# Patient Record
Sex: FEMALE | Race: WHITE | NOT HISPANIC OR LATINO | URBAN - METROPOLITAN AREA
[De-identification: names, ages, dates, MRNs, and addresses within clinical notes are randomized per-mention and may not be internally consistent; named-entity substitution may affect disease eponyms.]

---

## 2017-01-31 ENCOUNTER — EMERGENCY (EMERGENCY)
Facility: HOSPITAL | Age: 67
LOS: 1 days | Discharge: HOME | End: 2017-01-31
Attending: EMERGENCY MEDICINE | Admitting: EMERGENCY MEDICINE
Payer: COMMERCIAL

## 2017-01-31 VITALS
DIASTOLIC BLOOD PRESSURE: 79 MMHG | TEMPERATURE: 98 F | WEIGHT: 128.09 LBS | HEIGHT: 69.5 IN | SYSTOLIC BLOOD PRESSURE: 126 MMHG | OXYGEN SATURATION: 97 % | RESPIRATION RATE: 18 BRPM | HEART RATE: 61 BPM

## 2017-01-31 VITALS
TEMPERATURE: 98 F | HEART RATE: 59 BPM | RESPIRATION RATE: 18 BRPM | SYSTOLIC BLOOD PRESSURE: 122 MMHG | OXYGEN SATURATION: 97 % | DIASTOLIC BLOOD PRESSURE: 68 MMHG

## 2017-01-31 DIAGNOSIS — R10.32 LEFT LOWER QUADRANT PAIN: ICD-10-CM

## 2017-01-31 DIAGNOSIS — R42 DIZZINESS AND GIDDINESS: ICD-10-CM

## 2017-01-31 DIAGNOSIS — G43.909 MIGRAINE, UNSPECIFIED, NOT INTRACTABLE, WITHOUT STATUS MIGRAINOSUS: ICD-10-CM

## 2017-01-31 DIAGNOSIS — K02.7 DENTAL ROOT CARIES: Chronic | ICD-10-CM

## 2017-01-31 DIAGNOSIS — Z98.890 OTHER SPECIFIED POSTPROCEDURAL STATES: Chronic | ICD-10-CM

## 2017-01-31 LAB
ALBUMIN SERPL ELPH-MCNC: 3.5 G/DL — SIGNIFICANT CHANGE UP (ref 3.4–5)
ALP SERPL-CCNC: 58 U/L — SIGNIFICANT CHANGE UP (ref 40–120)
ALT FLD-CCNC: 27 U/L — SIGNIFICANT CHANGE UP (ref 12–42)
ANION GAP SERPL CALC-SCNC: 6 MMOL/L — LOW (ref 9–16)
APPEARANCE UR: CLEAR — SIGNIFICANT CHANGE UP
APTT BLD: 30.7 SEC — SIGNIFICANT CHANGE UP (ref 27.5–37.4)
AST SERPL-CCNC: 18 U/L — SIGNIFICANT CHANGE UP (ref 15–37)
BASOPHILS NFR BLD AUTO: 0.5 % — SIGNIFICANT CHANGE UP (ref 0–2)
BILIRUB SERPL-MCNC: 0.4 MG/DL — SIGNIFICANT CHANGE UP (ref 0.2–1.2)
BILIRUB UR-MCNC: NEGATIVE — SIGNIFICANT CHANGE UP
BUN SERPL-MCNC: 10 MG/DL — SIGNIFICANT CHANGE UP (ref 7–23)
CALCIUM SERPL-MCNC: 9.1 MG/DL — SIGNIFICANT CHANGE UP (ref 8.5–10.5)
CHLORIDE SERPL-SCNC: 104 MMOL/L — SIGNIFICANT CHANGE UP (ref 96–108)
CK MB CFR SERPL CALC: <1 NG/ML — SIGNIFICANT CHANGE UP (ref 0.5–3.6)
CK SERPL-CCNC: 95 U/L — SIGNIFICANT CHANGE UP (ref 26–192)
CO2 SERPL-SCNC: 30 MMOL/L — SIGNIFICANT CHANGE UP (ref 22–31)
COLOR SPEC: YELLOW — SIGNIFICANT CHANGE UP
CREAT SERPL-MCNC: 0.57 MG/DL — SIGNIFICANT CHANGE UP (ref 0.5–1.3)
DIFF PNL FLD: (no result)
EOSINOPHIL NFR BLD AUTO: 0.5 % — SIGNIFICANT CHANGE UP (ref 0–6)
GLUCOSE SERPL-MCNC: 126 MG/DL — HIGH (ref 70–99)
GLUCOSE UR QL: NEGATIVE — SIGNIFICANT CHANGE UP
HCT VFR BLD CALC: 38.7 % — SIGNIFICANT CHANGE UP (ref 34.5–45)
HGB BLD-MCNC: 13.2 G/DL — SIGNIFICANT CHANGE UP (ref 11.5–15.5)
INR BLD: 1.1 — SIGNIFICANT CHANGE UP (ref 0.88–1.16)
KETONES UR-MCNC: NEGATIVE — SIGNIFICANT CHANGE UP
LEUKOCYTE ESTERASE UR-ACNC: NEGATIVE — SIGNIFICANT CHANGE UP
LYMPHOCYTES # BLD AUTO: 29.9 % — SIGNIFICANT CHANGE UP (ref 13–44)
MCHC RBC-ENTMCNC: 31.1 PG — SIGNIFICANT CHANGE UP (ref 27–34)
MCHC RBC-ENTMCNC: 34.1 G/DL — SIGNIFICANT CHANGE UP (ref 32–36)
MCV RBC AUTO: 91.1 FL — SIGNIFICANT CHANGE UP (ref 80–100)
MONOCYTES NFR BLD AUTO: 5.6 % — SIGNIFICANT CHANGE UP (ref 2–14)
NEUTROPHILS NFR BLD AUTO: 63.5 % — SIGNIFICANT CHANGE UP (ref 43–77)
NITRITE UR-MCNC: NEGATIVE — SIGNIFICANT CHANGE UP
PH UR: 6 — SIGNIFICANT CHANGE UP (ref 4–8)
PLATELET # BLD AUTO: 279 K/UL — SIGNIFICANT CHANGE UP (ref 150–400)
POTASSIUM SERPL-MCNC: 4.6 MMOL/L — SIGNIFICANT CHANGE UP (ref 3.5–5.3)
POTASSIUM SERPL-SCNC: 4.6 MMOL/L — SIGNIFICANT CHANGE UP (ref 3.5–5.3)
PROT SERPL-MCNC: 7.2 G/DL — SIGNIFICANT CHANGE UP (ref 6.4–8.2)
PROT UR-MCNC: NEGATIVE MG/DL — SIGNIFICANT CHANGE UP
PROTHROM AB SERPL-ACNC: 12.2 SEC — SIGNIFICANT CHANGE UP (ref 10–13.1)
RBC # BLD: 4.25 M/UL — SIGNIFICANT CHANGE UP (ref 3.8–5.2)
RBC # FLD: 13.2 % — SIGNIFICANT CHANGE UP (ref 10.3–16.9)
SODIUM SERPL-SCNC: 140 MMOL/L — SIGNIFICANT CHANGE UP (ref 135–145)
SP GR SPEC: 1.02 — SIGNIFICANT CHANGE UP (ref 1–1.03)
TROPONIN I SERPL-MCNC: <0.015 NG/ML — SIGNIFICANT CHANGE UP (ref 0.01–0.04)
UROBILINOGEN FLD QL: 0.2 E.U./DL — SIGNIFICANT CHANGE UP
WBC # BLD: 6.3 K/UL — SIGNIFICANT CHANGE UP (ref 3.8–10.5)
WBC # FLD AUTO: 6.3 K/UL — SIGNIFICANT CHANGE UP (ref 3.8–10.5)

## 2017-01-31 PROCEDURE — 70450 CT HEAD/BRAIN W/O DYE: CPT

## 2017-01-31 PROCEDURE — 70450 CT HEAD/BRAIN W/O DYE: CPT | Mod: 26

## 2017-01-31 PROCEDURE — 80053 COMPREHEN METABOLIC PANEL: CPT

## 2017-01-31 PROCEDURE — 82553 CREATINE MB FRACTION: CPT

## 2017-01-31 PROCEDURE — 74177 CT ABD & PELVIS W/CONTRAST: CPT

## 2017-01-31 PROCEDURE — 81003 URINALYSIS AUTO W/O SCOPE: CPT

## 2017-01-31 PROCEDURE — 96374 THER/PROPH/DIAG INJ IV PUSH: CPT | Mod: XU

## 2017-01-31 PROCEDURE — 74177 CT ABD & PELVIS W/CONTRAST: CPT | Mod: 26

## 2017-01-31 PROCEDURE — 85730 THROMBOPLASTIN TIME PARTIAL: CPT

## 2017-01-31 PROCEDURE — 84484 ASSAY OF TROPONIN QUANT: CPT

## 2017-01-31 PROCEDURE — 99285 EMERGENCY DEPT VISIT HI MDM: CPT | Mod: 25

## 2017-01-31 PROCEDURE — 85610 PROTHROMBIN TIME: CPT

## 2017-01-31 PROCEDURE — 99284 EMERGENCY DEPT VISIT MOD MDM: CPT | Mod: 25

## 2017-01-31 PROCEDURE — 82550 ASSAY OF CK (CPK): CPT

## 2017-01-31 PROCEDURE — 81001 URINALYSIS AUTO W/SCOPE: CPT

## 2017-01-31 PROCEDURE — 96375 TX/PRO/DX INJ NEW DRUG ADDON: CPT | Mod: XU

## 2017-01-31 PROCEDURE — 93005 ELECTROCARDIOGRAM TRACING: CPT

## 2017-01-31 PROCEDURE — 85025 COMPLETE CBC W/AUTO DIFF WBC: CPT

## 2017-01-31 PROCEDURE — 93010 ELECTROCARDIOGRAM REPORT: CPT

## 2017-01-31 RX ORDER — ONDANSETRON 8 MG/1
4 TABLET, FILM COATED ORAL ONCE
Qty: 0 | Refills: 0 | Status: DISCONTINUED | OUTPATIENT
Start: 2017-01-31 | End: 2017-01-31

## 2017-01-31 RX ORDER — METOCLOPRAMIDE HCL 10 MG
10 TABLET ORAL ONCE
Qty: 0 | Refills: 0 | Status: COMPLETED | OUTPATIENT
Start: 2017-01-31 | End: 2017-01-31

## 2017-01-31 RX ORDER — SODIUM CHLORIDE 9 MG/ML
1000 INJECTION INTRAMUSCULAR; INTRAVENOUS; SUBCUTANEOUS ONCE
Qty: 0 | Refills: 0 | Status: COMPLETED | OUTPATIENT
Start: 2017-01-31 | End: 2017-01-31

## 2017-01-31 RX ORDER — IOHEXOL 300 MG/ML
50 INJECTION, SOLUTION INTRAVENOUS ONCE
Qty: 0 | Refills: 0 | Status: COMPLETED | OUTPATIENT
Start: 2017-01-31 | End: 2017-01-31

## 2017-01-31 RX ORDER — ACETAMINOPHEN 500 MG
1000 TABLET ORAL ONCE
Qty: 0 | Refills: 0 | Status: COMPLETED | OUTPATIENT
Start: 2017-01-31 | End: 2017-01-31

## 2017-01-31 RX ADMIN — Medication 400 MILLIGRAM(S): at 13:56

## 2017-01-31 RX ADMIN — Medication 104 MILLIGRAM(S): at 13:56

## 2017-01-31 RX ADMIN — SODIUM CHLORIDE 1333.33 MILLILITER(S): 9 INJECTION INTRAMUSCULAR; INTRAVENOUS; SUBCUTANEOUS at 13:56

## 2017-01-31 RX ADMIN — IOHEXOL 50 MILLILITER(S): 300 INJECTION, SOLUTION INTRAVENOUS at 13:57

## 2017-01-31 NOTE — ED PROVIDER NOTE - MEDICAL DECISION MAKING DETAILS
67 yo female with h/o migraine HA, recent root canal surgery not currently on meds presenting with dizziness that is not vertigo, HA, vomiting since Sunday. Pt is well appearing on exam with nl VS, nl cardiac, pulmonary, abdominal and neurological exam. Labs, EKG, CT scans unremarkable for acute pathology. Pt declined CXR. Feeling much better after IVFs, tylenol and reglan. Pt seen by PMD in ER who agrees with plan to discharge and follow up as outpatient. 65 yo female with h/o migraine HA, recent root canal surgery not currently on meds presenting with dizziness that is not vertigo, HA, vomiting since Sunday. Pt is well appearing on exam with nl VS, nl cardiac, pulmonary, abdominal and neurological exam. Labs, EKG, CT scans unremarkable for acute pathology. Pt declined CXR. Feeling much better after IVFs, tylenol and reglan. Very low concern for SAH given gradual onset and 4/10, intraabdominal pathology or ACS. Pt seen by PMD in ER who agrees with plan to discharge and follow up as outpatient.

## 2017-01-31 NOTE — ED PROVIDER NOTE - OBJECTIVE STATEMENT
67 yo female with h/o migraine, recent root canal a few weeks ago, pelvic sono secondary to persistent LLQ pain for 2 weeks presenting with dizziness, HA, mild photophobia and vomiting since Sunday. Pt denies abdominal pain currently. Denies fever, chills, changes in vision. States that dizziness is not vertigo. Denies chest pain, shortness of breath, focal weakness, slurred speech, changes in vision. 65 yo female with h/o migraine, recent root canal a few weeks ago, pelvic sono secondary to persistent LLQ pain for 2 weeks presenting with dizziness, gradual onset HA, mild photophobia and vomiting since Sunday. Pt denies abdominal pain currently. Denies fever, chills, changes in vision. States that dizziness is not vertigo. Denies chest pain, shortness of breath, focal weakness, slurred speech, changes in vision. 65 yo female with h/o migraine, recent root canal a few weeks ago, pelvic sono secondary to persistent LLQ pain for 2 weeks presenting with dizziness, gradual onset 4/10 HA, mild photophobia and vomiting since Sunday. Pt denies abdominal pain currently. Denies fever, chills, changes in vision. States that dizziness is not vertigo. Denies chest pain, shortness of breath, focal weakness, slurred speech, changes in vision.

## 2017-01-31 NOTE — ED CLERICAL - NS ED CLERK NOTE PRE-ARRIVAL INFORMATION; ADDITIONAL PRE-ARRIVAL INFORMATION
68 Y/O F FRED GUADARRAMA BEING SENT IN BY CLARIBEL STRONG FOR ABPAIN FOR 1 WEEK VOMITING FOR 3 DAYS (PT IS THE  WIFE VIP)

## 2017-01-31 NOTE — ED PROVIDER NOTE - CARE PLAN
Principal Discharge DX:	Migraine without aura and without status migrainosus, not intractable  Secondary Diagnosis:	Non-intractable vomiting with nausea, unspecified vomiting type

## 2017-01-31 NOTE — ED ADULT NURSE NOTE - OBJECTIVE STATEMENT
Patient reports feeling dizziness 2 days ago accompanied by bouts of nausea and 2 episodes of vomitus yesterday food particles with decreased PO intake, food and fluids.  Patient denies diarrhea/constipation.  Alcohol use occasional, denies tobacco use.  Patient denies any recent trauma or surgeries.  Denies chest pain, shortness of breath.  Denies dysuria, hematuria.  Spouse at bedside.  Patient arrived as a referral from Dr. Collins's office, [facility] provider.  Provider to evaluate and determine appropriate treatment.  EKG performed in triage and shows Sinus bradycardia with arrhythmia.  Patient does reports higher stress level in recent weeks along with 3 visits to the dentist for root canal work.

## 2017-01-31 NOTE — ED ADULT NURSE REASSESSMENT NOTE - COMFORT CARE
repositioned/plan of care explained/po fluids offered/darkened lights/warm blanket provided/wait time explained

## 2017-01-31 NOTE — ED ADULT NURSE REASSESSMENT NOTE - NS ED NURSE REASSESS COMMENT FT1
Patient given sandwich and juice and tolerating.  Patient preparing for discharge.
Patient ready for CT scan.  To be taken to scanner in 5 minutes.

## 2017-11-27 ENCOUNTER — INPATIENT (INPATIENT)
Facility: HOSPITAL | Age: 67
LOS: 0 days | Discharge: HOME | DRG: 313 | End: 2017-11-28
Attending: INTERNAL MEDICINE | Admitting: INTERNAL MEDICINE
Payer: COMMERCIAL

## 2017-11-27 VITALS
HEART RATE: 86 BPM | OXYGEN SATURATION: 100 % | SYSTOLIC BLOOD PRESSURE: 105 MMHG | TEMPERATURE: 98 F | RESPIRATION RATE: 18 BRPM | WEIGHT: 130.07 LBS | HEIGHT: 69 IN | DIASTOLIC BLOOD PRESSURE: 74 MMHG

## 2017-11-27 DIAGNOSIS — K02.7 DENTAL ROOT CARIES: Chronic | ICD-10-CM

## 2017-11-27 DIAGNOSIS — R07.89 OTHER CHEST PAIN: ICD-10-CM

## 2017-11-27 DIAGNOSIS — Z29.9 ENCOUNTER FOR PROPHYLACTIC MEASURES, UNSPECIFIED: ICD-10-CM

## 2017-11-27 DIAGNOSIS — Z98.890 OTHER SPECIFIED POSTPROCEDURAL STATES: Chronic | ICD-10-CM

## 2017-11-27 DIAGNOSIS — R63.8 OTHER SYMPTOMS AND SIGNS CONCERNING FOOD AND FLUID INTAKE: ICD-10-CM

## 2017-11-27 LAB
ALBUMIN SERPL ELPH-MCNC: 4.1 G/DL — SIGNIFICANT CHANGE UP (ref 3.3–5)
ALP SERPL-CCNC: 51 U/L — SIGNIFICANT CHANGE UP (ref 40–120)
ALT FLD-CCNC: 29 U/L — SIGNIFICANT CHANGE UP (ref 10–45)
ANION GAP SERPL CALC-SCNC: 13 MMOL/L — SIGNIFICANT CHANGE UP (ref 5–17)
APTT BLD: 29.6 SEC — SIGNIFICANT CHANGE UP (ref 27.5–37.4)
AST SERPL-CCNC: 30 U/L — SIGNIFICANT CHANGE UP (ref 10–40)
BASOPHILS NFR BLD AUTO: 0.3 % — SIGNIFICANT CHANGE UP (ref 0–2)
BILIRUB SERPL-MCNC: 0.3 MG/DL — SIGNIFICANT CHANGE UP (ref 0.2–1.2)
BUN SERPL-MCNC: 17 MG/DL — SIGNIFICANT CHANGE UP (ref 7–23)
CALCIUM SERPL-MCNC: 9.6 MG/DL — SIGNIFICANT CHANGE UP (ref 8.4–10.5)
CHLORIDE SERPL-SCNC: 103 MMOL/L — SIGNIFICANT CHANGE UP (ref 96–108)
CK MB CFR SERPL CALC: 1.6 NG/ML — SIGNIFICANT CHANGE UP (ref 0–6.7)
CK MB CFR SERPL CALC: 2.5 NG/ML — SIGNIFICANT CHANGE UP (ref 0–6.7)
CK SERPL-CCNC: 126 U/L — SIGNIFICANT CHANGE UP (ref 25–170)
CK SERPL-CCNC: 85 U/L — SIGNIFICANT CHANGE UP (ref 25–170)
CO2 SERPL-SCNC: 25 MMOL/L — SIGNIFICANT CHANGE UP (ref 22–31)
CREAT SERPL-MCNC: 0.64 MG/DL — SIGNIFICANT CHANGE UP (ref 0.5–1.3)
EOSINOPHIL NFR BLD AUTO: 0.4 % — SIGNIFICANT CHANGE UP (ref 0–6)
GLUCOSE SERPL-MCNC: 103 MG/DL — HIGH (ref 70–99)
HCT VFR BLD CALC: 37.3 % — SIGNIFICANT CHANGE UP (ref 34.5–45)
HGB BLD-MCNC: 12.9 G/DL — SIGNIFICANT CHANGE UP (ref 11.5–15.5)
INR BLD: 1.06 — SIGNIFICANT CHANGE UP (ref 0.88–1.16)
LYMPHOCYTES # BLD AUTO: 34.6 % — SIGNIFICANT CHANGE UP (ref 13–44)
MAGNESIUM SERPL-MCNC: 1.8 MG/DL — SIGNIFICANT CHANGE UP (ref 1.6–2.6)
MCHC RBC-ENTMCNC: 31.5 PG — SIGNIFICANT CHANGE UP (ref 27–34)
MCHC RBC-ENTMCNC: 34.6 G/DL — SIGNIFICANT CHANGE UP (ref 32–36)
MCV RBC AUTO: 91 FL — SIGNIFICANT CHANGE UP (ref 80–100)
MONOCYTES NFR BLD AUTO: 8 % — SIGNIFICANT CHANGE UP (ref 2–14)
NEUTROPHILS NFR BLD AUTO: 56.7 % — SIGNIFICANT CHANGE UP (ref 43–77)
PHOSPHATE SERPL-MCNC: 2.5 MG/DL — SIGNIFICANT CHANGE UP (ref 2.5–4.5)
PLATELET # BLD AUTO: 260 K/UL — SIGNIFICANT CHANGE UP (ref 150–400)
POTASSIUM SERPL-MCNC: 4 MMOL/L — SIGNIFICANT CHANGE UP (ref 3.5–5.3)
POTASSIUM SERPL-SCNC: 4 MMOL/L — SIGNIFICANT CHANGE UP (ref 3.5–5.3)
PROT SERPL-MCNC: 7.3 G/DL — SIGNIFICANT CHANGE UP (ref 6–8.3)
PROTHROM AB SERPL-ACNC: 11.8 SEC — SIGNIFICANT CHANGE UP (ref 9.8–12.7)
RBC # BLD: 4.1 M/UL — SIGNIFICANT CHANGE UP (ref 3.8–5.2)
RBC # FLD: 13.2 % — SIGNIFICANT CHANGE UP (ref 10.3–16.9)
SODIUM SERPL-SCNC: 141 MMOL/L — SIGNIFICANT CHANGE UP (ref 135–145)
TROPONIN T SERPL-MCNC: <0.01 NG/ML — SIGNIFICANT CHANGE UP (ref 0–0.01)
TROPONIN T SERPL-MCNC: <0.01 NG/ML — SIGNIFICANT CHANGE UP (ref 0–0.01)
WBC # BLD: 6.8 K/UL — SIGNIFICANT CHANGE UP (ref 3.8–10.5)
WBC # FLD AUTO: 6.8 K/UL — SIGNIFICANT CHANGE UP (ref 3.8–10.5)

## 2017-11-27 PROCEDURE — 71010: CPT | Mod: 26

## 2017-11-27 PROCEDURE — 93010 ELECTROCARDIOGRAM REPORT: CPT

## 2017-11-27 PROCEDURE — 75574 CT ANGIO HRT W/3D IMAGE: CPT | Mod: 26

## 2017-11-27 PROCEDURE — 86077 PHYS BLOOD BANK SERV XMATCH: CPT

## 2017-11-27 PROCEDURE — 99285 EMERGENCY DEPT VISIT HI MDM: CPT | Mod: 25

## 2017-11-27 RX ORDER — METOPROLOL TARTRATE 50 MG
25 TABLET ORAL ONCE
Qty: 0 | Refills: 0 | Status: COMPLETED | OUTPATIENT
Start: 2017-11-27 | End: 2017-11-27

## 2017-11-27 RX ORDER — LIDOCAINE 4 G/100G
10 CREAM TOPICAL ONCE
Qty: 0 | Refills: 0 | Status: COMPLETED | OUTPATIENT
Start: 2017-11-27 | End: 2017-11-27

## 2017-11-27 RX ORDER — SODIUM CHLORIDE 9 MG/ML
1000 INJECTION INTRAMUSCULAR; INTRAVENOUS; SUBCUTANEOUS
Qty: 0 | Refills: 0 | Status: DISCONTINUED | OUTPATIENT
Start: 2017-11-27 | End: 2017-11-28

## 2017-11-27 RX ORDER — FAMOTIDINE 10 MG/ML
20 INJECTION INTRAVENOUS ONCE
Qty: 0 | Refills: 0 | Status: COMPLETED | OUTPATIENT
Start: 2017-11-27 | End: 2017-11-27

## 2017-11-27 RX ORDER — SODIUM CHLORIDE 9 MG/ML
1000 INJECTION INTRAMUSCULAR; INTRAVENOUS; SUBCUTANEOUS ONCE
Qty: 0 | Refills: 0 | Status: COMPLETED | OUTPATIENT
Start: 2017-11-27 | End: 2017-11-27

## 2017-11-27 RX ORDER — ASPIRIN/CALCIUM CARB/MAGNESIUM 324 MG
325 TABLET ORAL ONCE
Qty: 0 | Refills: 0 | Status: COMPLETED | OUTPATIENT
Start: 2017-11-27 | End: 2017-11-27

## 2017-11-27 RX ORDER — PANTOPRAZOLE SODIUM 20 MG/1
40 TABLET, DELAYED RELEASE ORAL
Qty: 0 | Refills: 0 | Status: DISCONTINUED | OUTPATIENT
Start: 2017-11-27 | End: 2017-11-28

## 2017-11-27 RX ADMIN — FAMOTIDINE 20 MILLIGRAM(S): 10 INJECTION INTRAVENOUS at 12:38

## 2017-11-27 RX ADMIN — Medication 30 MILLILITER(S): at 12:38

## 2017-11-27 RX ADMIN — Medication 325 MILLIGRAM(S): at 12:38

## 2017-11-27 RX ADMIN — LIDOCAINE 10 MILLILITER(S): 4 CREAM TOPICAL at 12:38

## 2017-11-27 RX ADMIN — SODIUM CHLORIDE 75 MILLILITER(S): 9 INJECTION INTRAMUSCULAR; INTRAVENOUS; SUBCUTANEOUS at 19:55

## 2017-11-27 RX ADMIN — SODIUM CHLORIDE 2000 MILLILITER(S): 9 INJECTION INTRAMUSCULAR; INTRAVENOUS; SUBCUTANEOUS at 17:06

## 2017-11-27 RX ADMIN — Medication 25 MILLIGRAM(S): at 13:07

## 2017-11-27 NOTE — ED ADULT NURSE NOTE - NS ED NURSE DISCH DISPOSITION
Patient arrives with complaint of left arm pain that started today. Patient denies unifocal weakness, dizziness, generalized weakness, headache, shortness of breath, or fall.  
Admitted

## 2017-11-27 NOTE — H&P ADULT - PROBLEM SELECTOR PLAN 1
- Pain description initially concerning for cardiac etiology although less likely given negative workup. Now associated with food intake with feeling of food getting stuck in throat and pain when it passes into stomach. Etiologies include esophagitis, diverticulum, stricture, achalasia or other motility d/o.   - Dr. Collins spoke to Dr. Vega and plan is for endoscopy tomorrow provided no acute changes.  - Puree diet, NPO at midnight.  - T&S in AM with morning labs.

## 2017-11-27 NOTE — ED ADULT NURSE NOTE - CHPI ED SYMPTOMS NEG
no back pain/no diaphoresis/no shortness of breath/no cough/no syncope/no nausea/no chills/no vomiting/no fever/no dizziness

## 2017-11-27 NOTE — ED ADULT TRIAGE NOTE - CHIEF COMPLAINT QUOTE
PT CO CP and Dysphagia since 0300 today.  Pt states "It hurts to swallow but the pain is in my chest."  Pt denies N/V/D, SOB, Fevers, Dizziness at this time.  EKG in progress.

## 2017-11-27 NOTE — ED PROVIDER NOTE - MEDICAL DECISION MAKING DETAILS
68 yo female, otherwise healthy, s/p back surgery 1994, takes baby aspirin, Vit B and fish oil daily, no prescription meds, presents with chest pain that woke her up from sleep approx 10 hours ago (3 am) which radiated to her left arm described as "burning sensation and heaviness". Pt took chantel seltzer with some improvement and went back to bed. Woke up with some dull pain and this am approx 8 am (5 hours ago) she was eating a muffin and experienced sharp pains in mid chest, "felt like it was stuck in there...something is in there". labs WNL. CTA chest with no acute findings. Pt with sharp pain mid sternum when attempting to eat a cheese sandwich in ED. No pooling of secretions. Case discussed with Dr. Collins and pt admitted for upper endoscopy by Dr. Vega tomorrow. Dr. Vega aware.

## 2017-11-27 NOTE — H&P ADULT - HISTORY OF PRESENT ILLNESS
67F no pmhx who presents with chest pain that woke her up from sleep at 3am and at that time radiated to her left arm. She initially described the pain as sharp and then burning followed by a feeling of heaviness. She took chantel-seltzer and walked around her apartment and the pain subsided. She woke up and had some residual dull pain but went to work. When she was eating a 67F no pmhx who presents with chest pain that woke her up from sleep at 3am and at that time radiated to her left arm. She initially described the pain as sharp and then burning followed by a feeling of heaviness. She took chantel-seltzer and walked around her apartment and the pain subsided. She woke up and had some residual dull pain but went to work. When she was eating a muffin at work she felt the food go down and get stuck mid chest. She swallowed a few more times and felt the food pass with a sharp pain mid-chest which then became a dull pain. She spoke to her PMD and came to the ED. In the ED VS: HR 51-86, BP /57-74, RR 16-18, SpO2 100% RA. She was given , 1L NS bolus, Maalox/Famotidine/Lidocaine PO, Metoprolol 25mg once and endorsed to the medicine service.    She denies any current CP/SOB/F/C/N/V/Diarrh./Constip.RIOS/PND. No history of GERD. Father had heart disease, but unclear what type. No changes in diet. Exercise stress test 5ya which was reportedly normal. ROS otherwise negative.

## 2017-11-27 NOTE — H&P ADULT - NSHPLABSRESULTS_GEN_ALL_CORE
.  LABS:                         12.9   6.8   )-----------( 260      ( 27 Nov 2017 12:16 )             37.3     11-27    141  |  103  |  17  ----------------------------<  103<H>  4.0   |  25  |  0.64    Ca    9.6      27 Nov 2017 12:16    TPro  7.3  /  Alb  4.1  /  TBili  0.3  /  DBili  x   /  AST  30  /  ALT  29  /  AlkPhos  51  11-27    PT/INR - ( 27 Nov 2017 12:16 )   PT: 11.8 sec;   INR: 1.06          PTT - ( 27 Nov 2017 12:16 )  PTT:29.6 sec    CARDIAC MARKERS ( 27 Nov 2017 12:16 )  x     / <0.01 ng/mL / 126 U/L / x     / 2.5 ng/mL      Serum Pro-Brain Natriuretic Peptide: 52 pg/mL (11-27 @ 12:16)        RADIOLOGY, EKG & ADDITIONAL TESTS: Reviewed.     < from: CT Angio Cardiac w/ IV Cont (11.27.17 @ 18:02) >    IMPRESSION:   1.  The calcium score is0   2.  Normal coronary arteries  3.  Normal ejection fraction without segmental wall motion abnormalities    < end of copied text >    < from: Xray Chest 1 View AP-PORTABLE IMMEDIATE (11.27.17 @ 12:44) >    Impression:  Clear lungs.    < end of copied text >

## 2017-11-27 NOTE — H&P ADULT - PROBLEM SELECTOR PLAN 2
- Patient follows with Dr. Lopez - Cardiologist  - EKG NSR, CTA negative for dissection, calcium score 0, No wall motion abnormalities. Cardiac enzymes negative x1.   - f/u 10pm cardiac enzymes.  - Reach out to Dr. Lopez in AM for collateral.

## 2017-11-27 NOTE — ED PROVIDER NOTE - PROGRESS NOTE DETAILS
labs/ studies noted. CTA discussed with radiology and with no acute findings, no PE/ aortic dissection/ acute lung findings. CTA coronaries with no acute findings per cardiology fellow/ Dr. Zepeda. po trial attempted in ED- pt tolerating po liquids but with sharp substernal CP when she tried to swallow a bite of a cheese sandwich. States she has had an upper endoscopy "years ago" for "stomach issues" with no acute findings. case discussed with Dr. Collins and pt to be admitted for upper endoscopy by Dr. Vega. Dr. Vega aware. labs/ studies noted. CTA discussed with radiology and with no acute findings, no PE/ aortic dissection/ acute lung findings. CTA coronaries with no acute findings per cardiology fellow/ Dr. Zepeda (normal coronaries with zero calcium score). Po trial attempted in ED- pt tolerating po liquids but with sharp substernal CP when she tried to swallow a bite of a cheese sandwich. States she has had an upper endoscopy "years ago" for "stomach issues" with no acute findings. case discussed with Dr. Collins and pt to be admitted for upper endoscopy by Dr. Vega. Dr. Vega aware.

## 2017-11-27 NOTE — H&P ADULT - ASSESSMENT
67F w/ non-specific chest pain seemingly related to food although woke patient up in the middle of the night. EKG NSR, CTA w/ normal coronaries and negative for dissection. Cardiac enzymes negative x 1. Likely GI related.

## 2017-11-27 NOTE — H&P ADULT - NSHPPHYSICALEXAM_GEN_ALL_CORE
.  VITAL SIGNS:  T(C): 36.8 (11-27-17 @ 18:12), Max: 36.8 (11-27-17 @ 14:31)  T(F): 98.2 (11-27-17 @ 18:12), Max: 98.2 (11-27-17 @ 14:31)  HR: 68 (11-27-17 @ 18:12) (51 - 86)  BP: 118/68 (11-27-17 @ 18:12) (92/57 - 118/68)  BP(mean): --  RR: 17 (11-27-17 @ 18:12) (15 - 18)  SpO2: 99% (11-27-17 @ 18:12) (98% - 100%)  Wt(kg): --    PHYSICAL EXAM:    Constitutional: WDWN resting comfortably in bed; NAD  Head: NC/AT  Eyes: PERRL, EOMI, anicteric sclera  ENT: no nasal discharge; uvula midline, no oropharyngeal erythema or exudates; MMM  Neck: supple; no JVD or thyromegaly. small thyroid nodule, non-tender.  Respiratory: CTA B/L; no W/R/R, no retractions  Cardiac: +S1/S2; RRR; no M/R/G; PMI non-displaced  Gastrointestinal: soft, NT/ND; no rebound or guarding; +BSx4  Back: spine midline, no bony tenderness or step-offs; no CVAT B/L  Extremities: WWP, no clubbing or cyanosis; no peripheral edema  Musculoskeletal: NROM x4; no joint swelling, tenderness or erythema  Vascular: 2+ radial, femoral, DP/PT pulses B/L  Dermatologic: skin warm, dry and intact; no rashes, wounds, or scars  Lymphatic: no submandibular or cervical LAD  Neurologic: AAOx3; CNII-XII grossly intact; no focal deficits  Psychiatric: affect and characteristics of appearance, verbalizations, behaviors are appropriate

## 2017-11-27 NOTE — ED PROVIDER NOTE - CONDUCTED A DETAILED DISCUSSION WITH PATIENT AND/OR GUARDIAN REGARDING, MDM
return to ED if symptoms worsen, persist or questions arise/radiology results/need to admit/lab results

## 2017-11-27 NOTE — ED PROVIDER NOTE - OBJECTIVE STATEMENT
68 yo female, otherwise healthy, s/p back surgery 1994, takes baby aspirin, Vit B and fish oil daily, no prescription meds, presents with chest pain that woke her up from sleep approx 10 hours ago (3 am) which radiated to her left arm described as "burning sensation and heaviness". Pt took chantel seltzer with some improvement and went back to bed. Woke up with some dull pain and this am approx 8 am (5 hours ago) she was eating a muffin and experienced sharp pains in mid chest, "felt like it was stuck in there...something is in there" which then became a dull pain. Tolerating secretions. No stridor/ N/V/ abd pain/ fevers/ chills/ paresthesias/ weakness/ focal neuro sxs. No hx of GERD. Did not eat anything unusual last night. Had an exercise stress test approx 5 years ago which she reports was normal. 66 yo female, otherwise healthy, s/p back surgery 1994, takes baby aspirin, Vit B and fish oil daily, no prescription meds, presents with chest pain that woke her up from sleep approx 10 hours ago (3 am) which radiated to her left arm described as "burning sensation and heaviness". Pt took chantel seltzer with some improvement and went back to bed. Woke up with some dull pain and this am approx 8 am (5 hours ago) she was eating a muffin and experienced sharp pains in mid chest, "felt like it was stuck in there...something is in there" which then became a dull pain. Tolerating secretions. No stridor/ N/V/ abd pain/ fevers/ chills/ paresthesias/ weakness/ focal neuro sxs/ blurry vision/ speech changes. No hx of GERD. Did not eat anything unusual last night. Had an exercise stress test approx 5 years ago which she reports was normal.

## 2017-11-27 NOTE — ED PROVIDER NOTE - ENMT, MLM
Airway patent, Nasal mucosa clear. Mouth with normal mucosa. Throat has no vesicles, no oropharyngeal exudates and uvula is midline. Airway patent, Nasal mucosa clear. Mouth with normal mucosa. Throat has no vesicles, no oropharyngeal exudates and uvula is midline. No pooling of secretions.

## 2017-11-28 ENCOUNTER — RESULT REVIEW (OUTPATIENT)
Age: 67
End: 2017-11-28

## 2017-11-28 VITALS
RESPIRATION RATE: 17 BRPM | SYSTOLIC BLOOD PRESSURE: 112 MMHG | DIASTOLIC BLOOD PRESSURE: 72 MMHG | TEMPERATURE: 98 F | HEART RATE: 73 BPM | OXYGEN SATURATION: 95 %

## 2017-11-28 DIAGNOSIS — R07.9 CHEST PAIN, UNSPECIFIED: ICD-10-CM

## 2017-11-28 LAB
ANION GAP SERPL CALC-SCNC: 11 MMOL/L — SIGNIFICANT CHANGE UP (ref 5–17)
BLD GP AB SCN SERPL QL: POSITIVE — SIGNIFICANT CHANGE UP
BLD GP AB SCN SERPL QL: POSITIVE — SIGNIFICANT CHANGE UP
BUN SERPL-MCNC: 10 MG/DL — SIGNIFICANT CHANGE UP (ref 7–23)
CALCIUM SERPL-MCNC: 9 MG/DL — SIGNIFICANT CHANGE UP (ref 8.4–10.5)
CHLORIDE SERPL-SCNC: 107 MMOL/L — SIGNIFICANT CHANGE UP (ref 96–108)
CO2 SERPL-SCNC: 25 MMOL/L — SIGNIFICANT CHANGE UP (ref 22–31)
CREAT SERPL-MCNC: 0.63 MG/DL — SIGNIFICANT CHANGE UP (ref 0.5–1.3)
GLUCOSE SERPL-MCNC: 91 MG/DL — SIGNIFICANT CHANGE UP (ref 70–99)
HCT VFR BLD CALC: 34.8 % — SIGNIFICANT CHANGE UP (ref 34.5–45)
HGB BLD-MCNC: 11.5 G/DL — SIGNIFICANT CHANGE UP (ref 11.5–15.5)
INR BLD: 1.1 — SIGNIFICANT CHANGE UP (ref 0.88–1.16)
MAGNESIUM SERPL-MCNC: 1.8 MG/DL — SIGNIFICANT CHANGE UP (ref 1.6–2.6)
MCHC RBC-ENTMCNC: 30.8 PG — SIGNIFICANT CHANGE UP (ref 27–34)
MCHC RBC-ENTMCNC: 33 G/DL — SIGNIFICANT CHANGE UP (ref 32–36)
MCV RBC AUTO: 93.3 FL — SIGNIFICANT CHANGE UP (ref 80–100)
PLATELET # BLD AUTO: 233 K/UL — SIGNIFICANT CHANGE UP (ref 150–400)
POTASSIUM SERPL-MCNC: 4.4 MMOL/L — SIGNIFICANT CHANGE UP (ref 3.5–5.3)
POTASSIUM SERPL-SCNC: 4.4 MMOL/L — SIGNIFICANT CHANGE UP (ref 3.5–5.3)
PROTHROM AB SERPL-ACNC: 12.2 SEC — SIGNIFICANT CHANGE UP (ref 9.8–12.7)
RBC # BLD: 3.73 M/UL — LOW (ref 3.8–5.2)
RBC # FLD: 13.2 % — SIGNIFICANT CHANGE UP (ref 10.3–16.9)
RH IG SCN BLD-IMP: NEGATIVE — SIGNIFICANT CHANGE UP
RH IG SCN BLD-IMP: NEGATIVE — SIGNIFICANT CHANGE UP
SODIUM SERPL-SCNC: 143 MMOL/L — SIGNIFICANT CHANGE UP (ref 135–145)
TSH SERPL-MCNC: 0.87 UIU/ML — SIGNIFICANT CHANGE UP (ref 0.35–4.94)
WBC # BLD: 6.5 K/UL — SIGNIFICANT CHANGE UP (ref 3.8–10.5)
WBC # FLD AUTO: 6.5 K/UL — SIGNIFICANT CHANGE UP (ref 3.8–10.5)

## 2017-11-28 PROCEDURE — 36415 COLL VENOUS BLD VENIPUNCTURE: CPT

## 2017-11-28 PROCEDURE — 86900 BLOOD TYPING SEROLOGIC ABO: CPT

## 2017-11-28 PROCEDURE — 83690 ASSAY OF LIPASE: CPT

## 2017-11-28 PROCEDURE — 93005 ELECTROCARDIOGRAM TRACING: CPT

## 2017-11-28 PROCEDURE — 85730 THROMBOPLASTIN TIME PARTIAL: CPT

## 2017-11-28 PROCEDURE — 85610 PROTHROMBIN TIME: CPT

## 2017-11-28 PROCEDURE — 82553 CREATINE MB FRACTION: CPT

## 2017-11-28 PROCEDURE — 99285 EMERGENCY DEPT VISIT HI MDM: CPT | Mod: 25

## 2017-11-28 PROCEDURE — 75574 CT ANGIO HRT W/3D IMAGE: CPT

## 2017-11-28 PROCEDURE — 88305 TISSUE EXAM BY PATHOLOGIST: CPT

## 2017-11-28 PROCEDURE — 86901 BLOOD TYPING SEROLOGIC RH(D): CPT

## 2017-11-28 PROCEDURE — 84100 ASSAY OF PHOSPHORUS: CPT

## 2017-11-28 PROCEDURE — 84484 ASSAY OF TROPONIN QUANT: CPT

## 2017-11-28 PROCEDURE — 82550 ASSAY OF CK (CPK): CPT

## 2017-11-28 PROCEDURE — 85025 COMPLETE CBC W/AUTO DIFF WBC: CPT

## 2017-11-28 PROCEDURE — 86870 RBC ANTIBODY IDENTIFICATION: CPT

## 2017-11-28 PROCEDURE — 88341 IMHCHEM/IMCYTCHM EA ADD ANTB: CPT

## 2017-11-28 PROCEDURE — 85379 FIBRIN DEGRADATION QUANT: CPT

## 2017-11-28 PROCEDURE — 71045 X-RAY EXAM CHEST 1 VIEW: CPT

## 2017-11-28 PROCEDURE — 85027 COMPLETE CBC AUTOMATED: CPT

## 2017-11-28 PROCEDURE — 80048 BASIC METABOLIC PNL TOTAL CA: CPT

## 2017-11-28 PROCEDURE — 86850 RBC ANTIBODY SCREEN: CPT

## 2017-11-28 PROCEDURE — 86904 BLOOD TYPING PATIENT SERUM: CPT

## 2017-11-28 PROCEDURE — 83735 ASSAY OF MAGNESIUM: CPT

## 2017-11-28 PROCEDURE — 84443 ASSAY THYROID STIM HORMONE: CPT

## 2017-11-28 PROCEDURE — 80053 COMPREHEN METABOLIC PANEL: CPT

## 2017-11-28 PROCEDURE — 96374 THER/PROPH/DIAG INJ IV PUSH: CPT | Mod: XU

## 2017-11-28 PROCEDURE — 83880 ASSAY OF NATRIURETIC PEPTIDE: CPT

## 2017-11-28 PROCEDURE — 86880 COOMBS TEST DIRECT: CPT

## 2017-11-28 RX ORDER — ASPIRIN/CALCIUM CARB/MAGNESIUM 324 MG
1 TABLET ORAL
Qty: 0 | Refills: 0 | COMMUNITY

## 2017-11-28 RX ORDER — PANTOPRAZOLE SODIUM 20 MG/1
1 TABLET, DELAYED RELEASE ORAL
Qty: 30 | Refills: 0
Start: 2017-11-28 | End: 2017-12-28

## 2017-11-28 RX ORDER — ASPIRIN/CALCIUM CARB/MAGNESIUM 324 MG
81 TABLET ORAL DAILY
Qty: 0 | Refills: 0 | Status: DISCONTINUED | OUTPATIENT
Start: 2017-11-28 | End: 2017-11-28

## 2017-11-28 RX ORDER — MAGNESIUM SULFATE 500 MG/ML
1 VIAL (ML) INJECTION ONCE
Qty: 0 | Refills: 0 | Status: COMPLETED | OUTPATIENT
Start: 2017-11-28 | End: 2017-11-28

## 2017-11-28 RX ORDER — SUCRALFATE 1 G
1 TABLET ORAL
Qty: 120 | Refills: 0
Start: 2017-11-28 | End: 2017-12-28

## 2017-11-28 RX ADMIN — Medication 100 GRAM(S): at 09:51

## 2017-11-28 RX ADMIN — SODIUM CHLORIDE 75 MILLILITER(S): 9 INJECTION INTRAMUSCULAR; INTRAVENOUS; SUBCUTANEOUS at 06:54

## 2017-11-28 NOTE — PROGRESS NOTE ADULT - PROBLEM SELECTOR PLAN 2
- Patient follows with Dr. Lopez - Cardiologist  - EKG NSR, CTA negative for dissection, calcium score 0, No wall motion abnormalities. Cardiac enzymes negative x2  - cardiologist Dr. Lopez

## 2017-11-28 NOTE — PROGRESS NOTE ADULT - SUBJECTIVE AND OBJECTIVE BOX
I examined the patient today, reviewed the lab data, xrays and additional studies. Additionally I have reviewed the notes and assessments by the residents and consultants.   At this point I agree with the assessments and plan.  I would also advise close observation, and supportive care.  Gi evaluation  all problems followup in office as well

## 2017-11-28 NOTE — DISCHARGE NOTE ADULT - ADDITIONAL INSTRUCTIONS
The results of the biopsy performed during the endoscopy will be available in 2 weeks. You will need to follow up with Dr Vega in 2 weeks and discuss if a repeat endoscopy will be necessary in 4 - 6 weeks. The results of the biopsy performed during the endoscopy will be available in 2 weeks. You will need to follow up with Dr Vega in 2 weeks and discuss if a repeat endoscopy will be necessary in 4 - 6 weeks.  You also have an appointment with Dr Collins tomorrow, Nov 29 at 11 am.

## 2017-11-28 NOTE — DISCHARGE NOTE ADULT - CARE PROVIDER_API CALL
Anand Collins), Internal Medicine  [address] [place] [address]  Phone: [phone]  Fax: [phone]    Jerzy Vega), Medicine  [address]  [facility] [address]  [place] [address]  Phone: [phone]  Fax: [phone]

## 2017-11-28 NOTE — PROGRESS NOTE ADULT - PROBLEM SELECTOR PLAN 1
- Pt presenting w/mid sternal pain radiating to the left arm in the setting of negative trops/ekg   Now associated with food intake with feeling of food getting stuck in throat and pain when it passes into stomach. Etiologies include esophagitis, diverticulum, stricture, achalasia or other motility d/o.   - T&S in AM with morning labs  - Endoscopy on 11/28

## 2017-11-28 NOTE — DISCHARGE NOTE ADULT - PLAN OF CARE
You were admitted to [facility] because you had a chest pain radiating to your left arm. You had an EKG done and it was unremarkable. Some blood was also drawn to see if there was any damage to your heart and the results of this study were normal. You also underwent CT angiography to see if there were any blockages in the arteries in you chest and none were found.  You later underwent endoscopy and no chest pain, improvement in symptoms You were admitted to [facility] because you had a chest pain radiating to your left arm. You had an EKG done and it was unremarkable. Some blood was also drawn to see if there was any damage to your heart and the results of this study were normal. You also underwent CT angiography to see if there were any blockages in the arteries in you chest and none were found.  You later underwent endoscopy which showed severe esophagitis (inflammation of the lining of the esophagus) in mid esophagus probably from food impacted recently.  Biopsies were taken (the results will be available in 2 weeks).  Endoscopy also revealed moderate gastritis (inflammation of the stomach lining). It is not clear what caused this condition. Please continue taking Protonix and Carafate and follow up with Dr Vega in 2 weeks.

## 2017-11-28 NOTE — DISCHARGE NOTE ADULT - MEDICATION SUMMARY - MEDICATIONS TO TAKE
I will START or STAY ON the medications listed below when I get home from the hospital:    aspirin 81 mg oral tablet  -- 1 tab(s) by mouth once a day  -- Indication: For Prophylactic measure    Carafate 1 g oral tablet  -- 1 tab(s) by mouth 4 times a day   -- Do not take dairy products, antacids, or iron preparations within one hour of this medication.  It is very important that you take or use this exactly as directed.  Do not skip doses or discontinue unless directed by your doctor.  Take medication on an empty stomach 1 hour before or 2 to 3 hours after a meal unless otherwise directed by your doctor.    -- Indication: For esophagitis    Protonix 40 mg oral delayed release tablet  -- 1 tab(s) by mouth once a day  -- Indication: For esophagitis

## 2017-11-28 NOTE — DISCHARGE NOTE ADULT - CARE PLAN
Principal Discharge DX:	Chest pain, mid sternal  Instructions for follow-up, activity and diet:	You were admitted to [facility] because you had a chest pain radiating to your left arm. You had an EKG done and it was unremarkable. Some blood was also drawn to see if there was any damage to your heart and the results of this study were normal. You also underwent CT angiography to see if there were any blockages in the arteries in you chest and none were found.  You later underwent endoscopy and Principal Discharge DX:	Chest pain, mid sternal  Goal:	no chest pain, improvement in symptoms  Instructions for follow-up, activity and diet:	You were admitted to [facility] because you had a chest pain radiating to your left arm. You had an EKG done and it was unremarkable. Some blood was also drawn to see if there was any damage to your heart and the results of this study were normal. You also underwent CT angiography to see if there were any blockages in the arteries in you chest and none were found.  You later underwent endoscopy which showed severe esophagitis (inflammation of the lining of the esophagus) in mid esophagus probably from food impacted recently.  Biopsies were taken (the results will be available in 2 weeks).  Endoscopy also revealed moderate gastritis (inflammation of the stomach lining). It is not clear what caused this condition. Please continue taking Protonix and Carafate and follow up with Dr Vega in 2 weeks.

## 2017-11-28 NOTE — CONSULT NOTE ADULT - SUBJECTIVE AND OBJECTIVE BOX
67F no pmhx who presents with chest pain that woke her up from sleep at 3am and at that time radiated to her left arm. She initially described the pain as sharp and then burning followed by a feeling of heaviness. She took chantel-seltzer and walked around her apartment and the pain subsided. She woke up and had some residual dull pain but went to work. When she was eating a muffin at work she felt the food go down and get stuck mid chest. She swallowed a few more times and felt the food pass with a sharp pain mid-chest which then became a dull pain. She spoke to Dr. Collins  and came to the ED.     She denies any current CP/SOB/F/C/N/V/Diarrh./Constip.RIOS/PND. No history of GERD.    REVIEW OF SYSTEMS:  Constitutional: No fever, weight loss or fatigue  ENMT:  No difficulty hearing, tinnitus, vertigo; No sinus or throat pain  Respiratory: No cough, wheezing, chills or hemoptysis  Cardiovascular: had chest pain, palpitations, dizziness or leg swelling  Gastrointestinal: No abdominal or epigastric pain. No nausea, vomiting or hematemesis; ?odynophagia. No melena or hematochezia.  Skin: No itching, burning, rashes or lesions   Musculoskeletal: No joint pain or swelling; No muscle, back or extremity pain    PAST MEDICAL & SURGICAL HISTORY:  No pertinent past medical history  Dental root caries  History of back surgery      FAMILY HISTORY:  No pertinent family history in first degree relatives      SOCIAL HISTORY:  Smoking Status: [ ] Current, [ ] Former, [ ] Never  Pack Years:    MEDICATIONS:  MEDICATIONS  (STANDING):  magnesium sulfate  IVPB 1 Gram(s) IV Intermittent once  pantoprazole    Tablet 40 milliGRAM(s) Oral two times a day before meals  sodium chloride 0.9%. 1000 milliLiter(s) (75 mL/Hr) IV Continuous <Continuous>    MEDICATIONS  (PRN):      Allergies    No Known Allergies    Intolerances        Vital Signs Last 24 Hrs  T(C): 36.8 (28 Nov 2017 08:35), Max: 36.9 (28 Nov 2017 05:30)  T(F): 98.2 (28 Nov 2017 08:35), Max: 98.4 (28 Nov 2017 05:30)  HR: 57 (28 Nov 2017 08:35) (51 - 86)  BP: 108/71 (28 Nov 2017 08:35) (92/57 - 127/80)  BP(mean): --  RR: 16 (28 Nov 2017 08:35) (15 - 18)  SpO2: 98% (28 Nov 2017 08:35) (96% - 100%)        PHYSICAL EXAM:    General: Well developed; well nourished; in no acute distress  HEENT: MMM, conjunctiva and sclera clear  Gastrointestinal: Soft, non-tender non-distended; Normal bowel sounds; No rebound or guarding  Extremities: Normal range of motion, No clubbing, cyanosis or edema  Neurological: Alert and oriented x3  Skin: Warm and dry. No obvious rash      LABS:                        11.5   6.5   )-----------( 233      ( 28 Nov 2017 06:00 )             34.8     11-28    143  |  107  |  10  ----------------------------<  91  4.4   |  25  |  0.63    Ca    9.0      28 Nov 2017 06:00  Phos  2.5     11-27  Mg     1.8     11-28    TPro  7.3  /  Alb  4.1  /  TBili  0.3  /  DBili  x   /  AST  30  /  ALT  29  /  AlkPhos  51  11-27          RADIOLOGY & ADDITIONAL STUDIES:

## 2017-11-28 NOTE — PROGRESS NOTE ADULT - SUBJECTIVE AND OBJECTIVE BOX
Pt seen and examined EGD done        MEDICATIONS:  MEDICATIONS  (STANDING):  pantoprazole    Tablet 40 milliGRAM(s) Oral two times a day before meals  sodium chloride 0.9%. 1000 milliLiter(s) (75 mL/Hr) IV Continuous <Continuous>    MEDICATIONS  (PRN):      Allergies    No Known Allergies    Intolerances        Vital Signs Last 24 Hrs  T(C): 36.8 (28 Nov 2017 08:35), Max: 36.9 (28 Nov 2017 05:30)  T(F): 98.2 (28 Nov 2017 08:35), Max: 98.4 (28 Nov 2017 05:30)  HR: 57 (28 Nov 2017 08:35) (51 - 86)  BP: 108/71 (28 Nov 2017 08:35) (92/57 - 127/80)  BP(mean): --  RR: 16 (28 Nov 2017 08:35) (15 - 18)  SpO2: 98% (28 Nov 2017 08:35) (96% - 100%)      PHYSICAL EXAM:    General: Well developed; well nourished; in no acute distress  HEENT: MMM, conjunctiva and sclera clear  Gastrointestinal: Soft non-tender non-distended; Normal bowel sounds; No hepatosplenomegaly  Skin: Warm and dry. No obvious rash    LABS:      CBC Full  -  ( 28 Nov 2017 06:00 )  WBC Count : 6.5 K/uL  Hemoglobin : 11.5 g/dL  Hematocrit : 34.8 %  Platelet Count - Automated : 233 K/uL  Mean Cell Volume : 93.3 fL  Mean Cell Hemoglobin : 30.8 pg  Mean Cell Hemoglobin Concentration : 33.0 g/dL  Auto Neutrophil # : x  Auto Lymphocyte # : x  Auto Monocyte # : x  Auto Eosinophil # : x  Auto Basophil # : x  Auto Neutrophil % : x  Auto Lymphocyte % : x  Auto Monocyte % : x  Auto Eosinophil % : x  Auto Basophil % : x    11-28    143  |  107  |  10  ----------------------------<  91  4.4   |  25  |  0.63    Ca    9.0      28 Nov 2017 06:00  Phos  2.5     11-27  Mg     1.8     11-28    TPro  7.3  /  Alb  4.1  /  TBili  0.3  /  DBili  x   /  AST  30  /  ALT  29  /  AlkPhos  51  11-27    PT/INR - ( 28 Nov 2017 06:00 )   PT: 12.2 sec;   INR: 1.10          PTT - ( 27 Nov 2017 12:16 )  PTT:29.6 sec                  RADIOLOGY & ADDITIONAL STUDIES (The following images were personally reviewed):

## 2017-11-28 NOTE — DISCHARGE NOTE ADULT - INSTRUCTIONS
Recommend: full liquid diet x 2 days (11/28 and 11/29) then soft diet x 2 days, then regular diet.  It is very important to avoid spicy, acidic food.

## 2017-11-28 NOTE — PROGRESS NOTE ADULT - ASSESSMENT
severe esophagitis in mid esophagus probably from food impacted recently.  Biopsies taken.  Moderate gastritis.    Rcommend: full liquid diet x 2 days then soft diet x 2 days then regular, avoid spicy, aCIDIC FOOD. May go home on sucralfate 1 gm tab melt in 50 cc warm water and drink QID and pantoprazole 40 mg daily.  ff up biopsy results in 2 weeks and repeat EGD in 4 - 6 weeks
67F w/ non-specific chest pain seemingly related to food although woke patient up in the middle of the night. EKG NSR, CTA w/ normal coronaries and negative for dissection. Cardiac enzymes negative x 2. Patient awaiting EGD on 11/28.

## 2017-11-28 NOTE — DISCHARGE NOTE ADULT - PATIENT PORTAL LINK FT
“You can access the FollowHealth Patient Portal, offered by [facility], by registering with the following website: [email or web address]”

## 2017-11-28 NOTE — PATIENT PROFILE ADULT. - TEACHING/LEARNING LEARNING PREFERENCES
skill demonstration/computer/internet/verbal instruction/video/written material/individual instruction/pictorial/audio/group instruction

## 2017-11-28 NOTE — DISCHARGE NOTE ADULT - HOSPITAL COURSE
67F w/ no PMH presented with non-specific chest pain was found to have normal ekg, CTA, and unremarkable cardiac enzymes subsequently underwent EGD which showed severe esophagitis and moderate gastritis. Patient was discharged on Protonix and Sucralfate with recommendations to follow up with gastroenterology in 2 weeks and possibly undergo repeat EGD in 4-6 weeks

## 2017-11-28 NOTE — PROGRESS NOTE ADULT - SUBJECTIVE AND OBJECTIVE BOX
INTERVAL HPI/OVERNIGHT EVENTS:  Patient was seen and examined at bedside. NAZIA O/N.   Pt c/o upper chest discomfort and pain upon swallowing. No other concerns. Pt denies SOB/dysuria/dysgeusia/abdominal pain or discomfotr/fevers/chills/changes in BMs/hematochesia or hematemesis.    VITAL SIGNS:  Vital Signs Last 24 Hrs  T(C): 36.8 (28 Nov 2017 08:35), Max: 36.9 (28 Nov 2017 05:30)  T(F): 98.2 (28 Nov 2017 08:35), Max: 98.4 (28 Nov 2017 05:30)  HR: 57 (28 Nov 2017 08:35) (51 - 86)  BP: 108/71 (28 Nov 2017 08:35) (92/57 - 127/80)  BP(mean): --  RR: 16 (28 Nov 2017 08:35) (15 - 18)  SpO2: 98% (28 Nov 2017 08:35) (96% - 100%)    PHYSICAL EXAM:    Constitutional: WDWN F in NAD  Eyes: PERRL, EOMI, sclera non-icteric  Neck: supple, trachea midline, no masses, no JVD  Respiratory: CTA b/l, good air entry b/l, no wheezing, no rhonchi, no rales, without accessory muscle use and no intercostal retractions  Cardiovascular: RRR, normal S1S2, no M/R/G  Gastrointestinal: soft, NTND, no masses palpable, BS normal  Extremities: Warm, well perfused, pulses equal bilateral upper and lower extremities, no edema, no clubbing  Neurological: AAOx3, CN Grossly intact  Skin: Normal temperature, warm, dry  Psych: no evidence of behavioral disturbance      MEDICATIONS  (STANDING):  magnesium sulfate  IVPB 1 Gram(s) IV Intermittent once  pantoprazole    Tablet 40 milliGRAM(s) Oral two times a day before meals  sodium chloride 0.9%. 1000 milliLiter(s) (75 mL/Hr) IV Continuous <Continuous>    MEDICATIONS  (PRN):      Allergies    No Known Allergies    Intolerances        LABS:                        11.5   6.5   )-----------( 233      ( 28 Nov 2017 06:00 )             34.8     11-28    143  |  107  |  10  ----------------------------<  91  4.4   |  25  |  0.63    Ca    9.0      28 Nov 2017 06:00  Phos  2.5     11-27  Mg     1.8     11-28    TPro  7.3  /  Alb  4.1  /  TBili  0.3  /  DBili  x   /  AST  30  /  ALT  29  /  AlkPhos  51  11-27    PT/INR - ( 28 Nov 2017 06:00 )   PT: 12.2 sec;   INR: 1.10          PTT - ( 27 Nov 2017 12:16 )  PTT:29.6 sec      RADIOLOGY & ADDITIONAL TESTS: INTERVAL HPI/OVERNIGHT EVENTS:  Patient was seen and examined at bedside. NAZIA O/N.   Pt c/o upper chest discomfort and pain upon swallowing. No other concerns. Pt denies SOB/dysuria/dysgeusia/abdominal pain or discomfotr/fevers/chills/changes in BMs/hematochesia or hematemesis.    VITAL SIGNS:  Vital Signs Last 24 Hrs  T(C): 36.8 (28 Nov 2017 08:35), Max: 36.9 (28 Nov 2017 05:30)  T(F): 98.2 (28 Nov 2017 08:35), Max: 98.4 (28 Nov 2017 05:30)  HR: 57 (28 Nov 2017 08:35) (51 - 86)  BP: 108/71 (28 Nov 2017 08:35) (92/57 - 127/80)  BP(mean): --  RR: 16 (28 Nov 2017 08:35) (15 - 18)  SpO2: 98% (28 Nov 2017 08:35) (96% - 100%)    PHYSICAL EXAM:    Constitutional: WDWN F in NAD  Eyes: PERRL, EOMI, sclera non-icteric  Neck: supple, no JVD  Respiratory: CTA b/l, good air entry b/l, no wheezing, no rhonchi, no rales, without accessory muscle use and no intercostal retractions  Cardiovascular: RRR, normal S1S2, no M/R/G  Gastrointestinal: soft, NTND, no masses palpable, BS normal  Extremities: Warm, well perfused, pulses equal bilateral upper and lower extremities, no edema, no clubbing  Neurological: AAOx3, CN Grossly intact, GORDON  Skin: Normal temperature, warm, dry  Psych: no evidence of behavioral disturbance      MEDICATIONS  (STANDING):  magnesium sulfate  IVPB 1 Gram(s) IV Intermittent once  pantoprazole    Tablet 40 milliGRAM(s) Oral two times a day before meals  sodium chloride 0.9%. 1000 milliLiter(s) (75 mL/Hr) IV Continuous <Continuous>    MEDICATIONS  (PRN):      Allergies    No Known Allergies    Intolerances        LABS:                        11.5   6.5   )-----------( 233      ( 28 Nov 2017 06:00 )             34.8     11-28    143  |  107  |  10  ----------------------------<  91  4.4   |  25  |  0.63    Ca    9.0      28 Nov 2017 06:00  Phos  2.5     11-27  Mg     1.8     11-28    TPro  7.3  /  Alb  4.1  /  TBili  0.3  /  DBili  x   /  AST  30  /  ALT  29  /  AlkPhos  51  11-27    PT/INR - ( 28 Nov 2017 06:00 )   PT: 12.2 sec;   INR: 1.10          PTT - ( 27 Nov 2017 12:16 )  PTT:29.6 sec      RADIOLOGY & ADDITIONAL TESTS:

## 2017-11-28 NOTE — PROGRESS NOTE ADULT - SUBJECTIVE AND OBJECTIVE BOX
pt had normal coronaries on cta pt felt food was stuck severe chest pain possible web or shatsky ring agree with upper endoscopy

## 2017-11-29 ENCOUNTER — OUTPATIENT (OUTPATIENT)
Dept: OUTPATIENT SERVICES | Facility: HOSPITAL | Age: 67
LOS: 1 days | End: 2017-11-29
Payer: COMMERCIAL

## 2017-11-29 DIAGNOSIS — K02.7 DENTAL ROOT CARIES: Chronic | ICD-10-CM

## 2017-11-29 DIAGNOSIS — Z98.890 OTHER SPECIFIED POSTPROCEDURAL STATES: Chronic | ICD-10-CM

## 2017-11-29 LAB — SURGICAL PATHOLOGY STUDY: SIGNIFICANT CHANGE UP

## 2017-11-29 PROCEDURE — 74230 X-RAY XM SWLNG FUNCJ C+: CPT

## 2017-11-29 PROCEDURE — 74230 X-RAY XM SWLNG FUNCJ C+: CPT | Mod: 26

## 2017-11-30 DIAGNOSIS — R07.9 CHEST PAIN, UNSPECIFIED: ICD-10-CM

## 2017-11-30 DIAGNOSIS — K29.70 GASTRITIS, UNSPECIFIED, WITHOUT BLEEDING: ICD-10-CM

## 2017-11-30 DIAGNOSIS — K20.9 ESOPHAGITIS, UNSPECIFIED: ICD-10-CM

## 2017-11-30 DIAGNOSIS — R13.10 DYSPHAGIA, UNSPECIFIED: ICD-10-CM

## 2019-06-24 ENCOUNTER — APPOINTMENT (OUTPATIENT)
Dept: THORACIC SURGERY | Facility: CLINIC | Age: 69
End: 2019-06-24
Payer: COMMERCIAL

## 2019-06-24 DIAGNOSIS — Z86.39 PERSONAL HISTORY OF OTHER ENDOCRINE, NUTRITIONAL AND METABOLIC DISEASE: ICD-10-CM

## 2019-06-24 PROCEDURE — 99205 OFFICE O/P NEW HI 60 MIN: CPT

## 2019-06-26 VITALS
DIASTOLIC BLOOD PRESSURE: 72 MMHG | TEMPERATURE: 98 F | WEIGHT: 130 LBS | HEIGHT: 70 IN | OXYGEN SATURATION: 99 % | BODY MASS INDEX: 18.61 KG/M2 | HEART RATE: 76 BPM | SYSTOLIC BLOOD PRESSURE: 130 MMHG | RESPIRATION RATE: 15 BRPM

## 2019-06-26 PROBLEM — Z86.39 HISTORY OF HYPERLIPIDEMIA: Status: RESOLVED | Noted: 2019-06-26 | Resolved: 2019-06-26

## 2019-06-26 PROBLEM — Z86.39 HISTORY OF THYROID NODULE: Status: RESOLVED | Noted: 2019-06-26 | Resolved: 2019-06-26

## 2019-06-26 NOTE — PHYSICAL EXAM
[General Appearance - Alert] : alert [General Appearance - In No Acute Distress] : in no acute distress [PERRL With Normal Accommodation] : pupils were equal in size, round, and reactive to light [Sclera] : the sclera and conjunctiva were normal [Outer Ear] : the ears and nose were normal in appearance [Both Tympanic Membranes Were Examined] : both tympanic membranes were normal [Neck Cervical Mass (___cm)] : no neck mass was observed [Respiration, Rhythm And Depth] : normal respiratory rhythm and effort [Heart Sounds] : normal S1 and S2 [Heart Rate And Rhythm] : heart rate was normal and rhythm regular [Apical Impulse] : the apical impulse was normal [Examination Of The Chest] : the chest was normal in appearance [2+] : right 2+ [No CVA Tenderness] : no ~M costovertebral angle tenderness [Abdomen Soft] : soft [Bowel Sounds] : normal bowel sounds [Musculoskeletal - Swelling] : no joint swelling seen [Nail Clubbing] : no clubbing  or cyanosis of the fingernails [Abnormal Walk] : normal gait [Skin Color & Pigmentation] : normal skin color and pigmentation [Cranial Nerves] : cranial nerves 2-12 were intact [Skin Turgor] : normal skin turgor [] : no rash [Oriented To Time, Place, And Person] : oriented to person, place, and time [Impaired Insight] : insight and judgment were intact [Affect] : the affect was normal

## 2019-06-27 ENCOUNTER — FORM ENCOUNTER (OUTPATIENT)
Age: 69
End: 2019-06-27

## 2019-06-28 ENCOUNTER — APPOINTMENT (OUTPATIENT)
Dept: THORACIC SURGERY | Facility: CLINIC | Age: 69
End: 2019-06-28
Payer: COMMERCIAL

## 2019-06-28 ENCOUNTER — OUTPATIENT (OUTPATIENT)
Dept: OUTPATIENT SERVICES | Facility: HOSPITAL | Age: 69
LOS: 1 days | End: 2019-06-28
Payer: COMMERCIAL

## 2019-06-28 ENCOUNTER — APPOINTMENT (OUTPATIENT)
Dept: CT IMAGING | Facility: HOSPITAL | Age: 69
End: 2019-06-28
Payer: COMMERCIAL

## 2019-06-28 VITALS
RESPIRATION RATE: 18 BRPM | TEMPERATURE: 97.8 F | WEIGHT: 131 LBS | BODY MASS INDEX: 18.75 KG/M2 | HEIGHT: 70 IN | DIASTOLIC BLOOD PRESSURE: 56 MMHG | SYSTOLIC BLOOD PRESSURE: 114 MMHG | HEART RATE: 74 BPM | OXYGEN SATURATION: 97 %

## 2019-06-28 DIAGNOSIS — R91.1 SOLITARY PULMONARY NODULE: ICD-10-CM

## 2019-06-28 DIAGNOSIS — K02.7 DENTAL ROOT CARIES: Chronic | ICD-10-CM

## 2019-06-28 DIAGNOSIS — Z98.890 OTHER SPECIFIED POSTPROCEDURAL STATES: Chronic | ICD-10-CM

## 2019-06-28 DIAGNOSIS — Z01.818 ENCOUNTER FOR OTHER PREPROCEDURAL EXAMINATION: ICD-10-CM

## 2019-06-28 LAB
ALBUMIN SERPL ELPH-MCNC: 4.6 G/DL — SIGNIFICANT CHANGE UP (ref 3.3–5)
ALP SERPL-CCNC: 57 U/L — SIGNIFICANT CHANGE UP (ref 40–120)
ALT FLD-CCNC: 20 U/L — SIGNIFICANT CHANGE UP (ref 10–45)
ANION GAP SERPL CALC-SCNC: 11 MMOL/L — SIGNIFICANT CHANGE UP (ref 5–17)
APTT BLD: 31 SEC — SIGNIFICANT CHANGE UP (ref 27.5–36.3)
AST SERPL-CCNC: 27 U/L — SIGNIFICANT CHANGE UP (ref 10–40)
BASOPHILS # BLD AUTO: 0.04 K/UL — SIGNIFICANT CHANGE UP (ref 0–0.2)
BASOPHILS NFR BLD AUTO: 0.7 % — SIGNIFICANT CHANGE UP (ref 0–2)
BILIRUB SERPL-MCNC: 0.2 MG/DL — SIGNIFICANT CHANGE UP (ref 0.2–1.2)
BUN SERPL-MCNC: 20 MG/DL — SIGNIFICANT CHANGE UP (ref 7–23)
CALCIUM SERPL-MCNC: 10.5 MG/DL — SIGNIFICANT CHANGE UP (ref 8.4–10.5)
CHLORIDE SERPL-SCNC: 104 MMOL/L — SIGNIFICANT CHANGE UP (ref 96–108)
CO2 SERPL-SCNC: 29 MMOL/L — SIGNIFICANT CHANGE UP (ref 22–31)
CREAT SERPL-MCNC: 0.76 MG/DL — SIGNIFICANT CHANGE UP (ref 0.5–1.3)
EOSINOPHIL # BLD AUTO: 0.05 K/UL — SIGNIFICANT CHANGE UP (ref 0–0.5)
EOSINOPHIL NFR BLD AUTO: 0.9 % — SIGNIFICANT CHANGE UP (ref 0–6)
GLUCOSE SERPL-MCNC: 78 MG/DL — SIGNIFICANT CHANGE UP (ref 70–99)
HCT VFR BLD CALC: 42.8 % — SIGNIFICANT CHANGE UP (ref 34.5–45)
HGB BLD-MCNC: 13.4 G/DL — SIGNIFICANT CHANGE UP (ref 11.5–15.5)
IMM GRANULOCYTES NFR BLD AUTO: 0.2 % — SIGNIFICANT CHANGE UP (ref 0–1.5)
INR BLD: 1.05 — SIGNIFICANT CHANGE UP (ref 0.88–1.16)
LYMPHOCYTES # BLD AUTO: 2.16 K/UL — SIGNIFICANT CHANGE UP (ref 1–3.3)
LYMPHOCYTES # BLD AUTO: 37.2 % — SIGNIFICANT CHANGE UP (ref 13–44)
MCHC RBC-ENTMCNC: 30.8 PG — SIGNIFICANT CHANGE UP (ref 27–34)
MCHC RBC-ENTMCNC: 31.3 GM/DL — LOW (ref 32–36)
MCV RBC AUTO: 98.4 FL — SIGNIFICANT CHANGE UP (ref 80–100)
MONOCYTES # BLD AUTO: 0.43 K/UL — SIGNIFICANT CHANGE UP (ref 0–0.9)
MONOCYTES NFR BLD AUTO: 7.4 % — SIGNIFICANT CHANGE UP (ref 2–14)
NEUTROPHILS # BLD AUTO: 3.11 K/UL — SIGNIFICANT CHANGE UP (ref 1.8–7.4)
NEUTROPHILS NFR BLD AUTO: 53.6 % — SIGNIFICANT CHANGE UP (ref 43–77)
NRBC # BLD: 0 /100 WBCS — SIGNIFICANT CHANGE UP (ref 0–0)
PLATELET # BLD AUTO: 305 K/UL — SIGNIFICANT CHANGE UP (ref 150–400)
POTASSIUM SERPL-MCNC: 4.8 MMOL/L — SIGNIFICANT CHANGE UP (ref 3.5–5.3)
POTASSIUM SERPL-SCNC: 4.8 MMOL/L — SIGNIFICANT CHANGE UP (ref 3.5–5.3)
PROT SERPL-MCNC: 7.3 G/DL — SIGNIFICANT CHANGE UP (ref 6–8.3)
PROTHROM AB SERPL-ACNC: 11.9 SEC — SIGNIFICANT CHANGE UP (ref 10–12.9)
RBC # BLD: 4.35 M/UL — SIGNIFICANT CHANGE UP (ref 3.8–5.2)
RBC # FLD: 12.7 % — SIGNIFICANT CHANGE UP (ref 10.3–14.5)
SODIUM SERPL-SCNC: 144 MMOL/L — SIGNIFICANT CHANGE UP (ref 135–145)
WBC # BLD: 5.8 K/UL — SIGNIFICANT CHANGE UP (ref 3.8–10.5)
WBC # FLD AUTO: 5.8 K/UL — SIGNIFICANT CHANGE UP (ref 3.8–10.5)

## 2019-06-28 PROCEDURE — 85730 THROMBOPLASTIN TIME PARTIAL: CPT

## 2019-06-28 PROCEDURE — 93010 ELECTROCARDIOGRAM REPORT: CPT

## 2019-06-28 PROCEDURE — 80053 COMPREHEN METABOLIC PANEL: CPT

## 2019-06-28 PROCEDURE — 85610 PROTHROMBIN TIME: CPT

## 2019-06-28 PROCEDURE — 85025 COMPLETE CBC W/AUTO DIFF WBC: CPT

## 2019-06-28 PROCEDURE — 93005 ELECTROCARDIOGRAM TRACING: CPT

## 2019-06-28 PROCEDURE — 99213 OFFICE O/P EST LOW 20 MIN: CPT

## 2019-06-28 PROCEDURE — 71250 CT THORAX DX C-: CPT | Mod: 26

## 2019-06-28 PROCEDURE — 71250 CT THORAX DX C-: CPT

## 2019-06-28 NOTE — PHYSICAL EXAM
[Sclera] : the sclera and conjunctiva were normal [Neck Appearance] : the appearance of the neck was normal [PERRL With Normal Accommodation] : pupils were equal in size, round, and reactive to light [Respiration, Rhythm And Depth] : normal respiratory rhythm and effort [Apical Impulse] : the apical impulse was normal [Heart Rate And Rhythm] : heart rate was normal and rhythm regular [Examination Of The Chest] : the chest was normal in appearance [2+] : left 2+ [Bowel Sounds] : normal bowel sounds [Abdomen Soft] : soft [Abdomen Tenderness] : non-tender [Abnormal Walk] : normal gait [Nail Clubbing] : no clubbing  or cyanosis of the fingernails [Musculoskeletal - Swelling] : no joint swelling seen [Skin Color & Pigmentation] : normal skin color and pigmentation [Skin Turgor] : normal skin turgor [] : no rash [Oriented To Time, Place, And Person] : oriented to person, place, and time [Impaired Insight] : insight and judgment were intact

## 2019-06-29 PROBLEM — R91.1 LUNG NODULE: Status: ACTIVE | Noted: 2019-06-25

## 2019-06-29 NOTE — HISTORY OF PRESENT ILLNESS
[FreeTextEntry1] : 68 year old female, never smoker, with a PMHx of HLD, benign thyroid nodule, with recent swelling of 2 left supraclavicular lymph nodes/ adenopathy x 1 week, not associated with any pain or discomfort. She is otherwise completely asymptomatic. She presents today for an initial evaluation. She is referred by Dr. Anand Collins. \par \par No recent imaging.

## 2019-06-29 NOTE — HISTORY OF PRESENT ILLNESS
[FreeTextEntry1] : 68 year old female, never smoker, with a PMHx of HLD, benign thyroid nodule, with recent swelling of 2 left supraclavicular lymph nodes/ adenopathy x 1 week, not associated with any pain or discomfort. She is otherwise completely asymptomatic. She presents today to review the CT chest. She is referred by Dr. Anand Collins. \par \par CT chest completed on 6/28/19:\par ****

## 2019-06-29 NOTE — ASSESSMENT
[FreeTextEntry1] : 68 year old female, never smoker, with a PMHx of HLD, benign thyroid nodule, with recent swelling of 2 left supraclavicular lymph nodes/ adenopathy x 1 week, not associated with any pain or discomfort. She is otherwise completely asymptomatic. She presents today to review recent CT chest.  \par \par Today the patient reports feeling well. She noticed two areas of swelling her left supraclavicular area around 1 week ago. A CT chest was ordered for further evaluation. After CT is reviewed we will set her up for a biopsy. All questions and concerns answered.  Lung nodules stable.\par \par Will proceed with US guided aspirate, biopsy of left supraclavicular area/lesion.  She may require resection of this.  In regards to aortic enlargement, the patient is aware, as is Dr. Collins who will have the patient follow up with Dr. Lopez.\par \par PLAN:\par \par

## 2019-06-29 NOTE — ASSESSMENT
[FreeTextEntry1] : 68 year old female, never smoker, with a PMHx of HLD, benign thyroid nodule, with recent swelling of 2 left supraclavicular lymph nodes/ adenopathy x 1 week, not associated with any pain or discomfort. She is otherwise completely asymptomatic. She presents today for an initial evaluation. She is referred by Dr. Anand Collins. \par \par Today the patient reports feeling well. She noticed two areas of swelling her left supraclavicular area around 1 week ago. I discussed that these are most likely benign and cystic in nature. A CT chest was ordered  for further evaluation. After CT is reviewed we will set her up for a biopsy. All questions and concerns answered.\par \par PLAN:\par 1. CT chest without contrast\par 2. Set up for biopsy of supraclavicular adenopathy

## 2019-07-09 ENCOUNTER — FORM ENCOUNTER (OUTPATIENT)
Age: 69
End: 2019-07-09

## 2019-07-10 ENCOUNTER — RESULT REVIEW (OUTPATIENT)
Age: 69
End: 2019-07-10

## 2019-07-10 ENCOUNTER — APPOINTMENT (OUTPATIENT)
Dept: INTERVENTIONAL RADIOLOGY/VASCULAR | Facility: HOSPITAL | Age: 69
End: 2019-07-10

## 2019-07-10 ENCOUNTER — OUTPATIENT (OUTPATIENT)
Dept: OUTPATIENT SERVICES | Facility: HOSPITAL | Age: 69
LOS: 1 days | End: 2019-07-10
Payer: COMMERCIAL

## 2019-07-10 ENCOUNTER — APPOINTMENT (OUTPATIENT)
Dept: ULTRASOUND IMAGING | Facility: HOSPITAL | Age: 69
End: 2019-07-10
Payer: COMMERCIAL

## 2019-07-10 DIAGNOSIS — Z98.890 OTHER SPECIFIED POSTPROCEDURAL STATES: Chronic | ICD-10-CM

## 2019-07-10 DIAGNOSIS — K02.7 DENTAL ROOT CARIES: Chronic | ICD-10-CM

## 2019-07-10 PROCEDURE — 88305 TISSUE EXAM BY PATHOLOGIST: CPT

## 2019-07-10 PROCEDURE — 10006 FNA BX W/US GDN EA ADDL: CPT

## 2019-07-10 PROCEDURE — 10005 FNA BX W/US GDN 1ST LES: CPT

## 2019-07-10 PROCEDURE — 88173 CYTOPATH EVAL FNA REPORT: CPT

## 2019-07-11 LAB — NON-GYNECOLOGICAL CYTOLOGY STUDY: SIGNIFICANT CHANGE UP

## 2019-08-01 ENCOUNTER — APPOINTMENT (OUTPATIENT)
Dept: THORACIC SURGERY | Facility: CLINIC | Age: 69
End: 2019-08-01
Payer: COMMERCIAL

## 2019-08-01 VITALS
HEIGHT: 70 IN | HEART RATE: 75 BPM | RESPIRATION RATE: 19 BRPM | BODY MASS INDEX: 18.9 KG/M2 | SYSTOLIC BLOOD PRESSURE: 112 MMHG | TEMPERATURE: 97.5 F | OXYGEN SATURATION: 96 % | WEIGHT: 132 LBS | DIASTOLIC BLOOD PRESSURE: 73 MMHG

## 2019-08-01 PROCEDURE — 99214 OFFICE O/P EST MOD 30 MIN: CPT

## 2019-08-05 NOTE — PHYSICAL EXAM
[Sclera] : the sclera and conjunctiva were normal [PERRL With Normal Accommodation] : pupils were equal in size, round, and reactive to light [Neck Appearance] : the appearance of the neck was normal [Respiration, Rhythm And Depth] : normal respiratory rhythm and effort [Apical Impulse] : the apical impulse was normal [Heart Rate And Rhythm] : heart rate was normal and rhythm regular [Examination Of The Chest] : the chest was normal in appearance [2+] : left 2+ [Bowel Sounds] : normal bowel sounds [Abnormal Walk] : normal gait [Abdomen Soft] : soft [Skin Color & Pigmentation] : normal skin color and pigmentation [Musculoskeletal - Swelling] : no joint swelling seen [Skin Turgor] : normal skin turgor [] : no rash [Oriented To Time, Place, And Person] : oriented to person, place, and time [Impaired Insight] : insight and judgment were intact [Affect] : the affect was normal [FreeTextEntry1] : supraclavicular cystic swelling

## 2019-08-05 NOTE — HISTORY OF PRESENT ILLNESS
[FreeTextEntry1] : 68 year old female, never smoker, with a PMHx of HLD, benign thyroid nodule, with recent swelling of 2 left supraclavicular lymph nodes/ adenopathy x 1 week, not associated with any pain or discomfort. She is otherwise completely asymptomatic. She recently underwent Left supraclavicular nodule FNA which was non-diagnostic. She presents today for re-evaluation and to discuss possible resection. She is referred by Dr. Anand Collins. \par \par Today the patient reports feeling well. She is asymptomatic. She does report that the supraclavicular cystic lesion returned around 3 days post aspiration. Not associated with pain or discomfort. \par \par CT chest completed on 6/28/19:\par - no change in 4 mm nodule in right lower lobe\par - crescentic shaped fluid attenuation structures noted anterior and superior to the left sternoclavicular joint and superior to the left clavicle\par \par LEFT supraclavicular nodule FNA completed on 7/10/19:\par - nondiagnostic, smear slides and cell block are acellular

## 2019-08-05 NOTE — ASSESSMENT
[FreeTextEntry1] : 68 year old female, never smoker, with a PMHx of HLD, benign thyroid nodule, with recent swelling of 2 left supraclavicular lymph nodes/ adenopathy x 1 week, not associated with any pain or discomfort. She is otherwise completely asymptomatic. She presents today post \par \par Today the patient reports feeling well. She underwent FNA of supraclavicular nodule on 7/10/19 which was NONDIAGNOSTIC, Smear slides and cell block are acellular. The cystic nodule has since returned. Since the fluid has reaccumulated, surgical excision is recommended. The patient would like to think about it over the weekend, and will call with her decision. \par \par She will need medical clearance and blood-work prior to resection. She will be scheduled for surgical excision of supraclavicular subcutaneous cyst once she is ready.\par \par PLAN:\par 1. Schedule for surgical resection of supraclavicular cystic nodule when patient is ready\par 2. Will need Medical Clearance and Blood-work\par

## 2020-03-08 ENCOUNTER — EMERGENCY (EMERGENCY)
Facility: HOSPITAL | Age: 70
LOS: 1 days | Discharge: HOME | End: 2020-03-08
Admitting: EMERGENCY MEDICINE
Payer: COMMERCIAL

## 2020-03-08 VITALS
SYSTOLIC BLOOD PRESSURE: 128 MMHG | RESPIRATION RATE: 17 BRPM | OXYGEN SATURATION: 100 % | HEIGHT: 69 IN | DIASTOLIC BLOOD PRESSURE: 78 MMHG | TEMPERATURE: 98 F | WEIGHT: 128.97 LBS | HEART RATE: 68 BPM

## 2020-03-08 DIAGNOSIS — J06.9 ACUTE UPPER RESPIRATORY INFECTION, UNSPECIFIED: ICD-10-CM

## 2020-03-08 DIAGNOSIS — K02.7 DENTAL ROOT CARIES: Chronic | ICD-10-CM

## 2020-03-08 DIAGNOSIS — Z98.890 OTHER SPECIFIED POSTPROCEDURAL STATES: Chronic | ICD-10-CM

## 2020-03-08 DIAGNOSIS — R51 HEADACHE: ICD-10-CM

## 2020-03-08 LAB — RAPID RVP RESULT: SIGNIFICANT CHANGE UP

## 2020-03-08 PROCEDURE — 87798 DETECT AGENT NOS DNA AMP: CPT

## 2020-03-08 PROCEDURE — 71046 X-RAY EXAM CHEST 2 VIEWS: CPT

## 2020-03-08 PROCEDURE — 71046 X-RAY EXAM CHEST 2 VIEWS: CPT | Mod: 26

## 2020-03-08 PROCEDURE — 87486 CHLMYD PNEUM DNA AMP PROBE: CPT

## 2020-03-08 PROCEDURE — 99283 EMERGENCY DEPT VISIT LOW MDM: CPT | Mod: 25

## 2020-03-08 PROCEDURE — 87581 M.PNEUMON DNA AMP PROBE: CPT

## 2020-03-08 PROCEDURE — 87633 RESP VIRUS 12-25 TARGETS: CPT

## 2020-03-08 PROCEDURE — 99284 EMERGENCY DEPT VISIT MOD MDM: CPT | Mod: 25

## 2020-03-08 RX ORDER — ACETAMINOPHEN 500 MG
1000 TABLET ORAL ONCE
Refills: 0 | Status: COMPLETED | OUTPATIENT
Start: 2020-03-08 | End: 2020-03-08

## 2020-03-08 RX ORDER — PSEUDOEPHEDRINE HCL 30 MG
30 TABLET ORAL ONCE
Refills: 0 | Status: COMPLETED | OUTPATIENT
Start: 2020-03-08 | End: 2020-03-08

## 2020-03-08 RX ADMIN — Medication 1000 MILLIGRAM(S): at 17:53

## 2020-03-08 RX ADMIN — Medication 1 TABLET(S): at 19:39

## 2020-03-08 RX ADMIN — Medication 30 MILLIGRAM(S): at 17:52

## 2020-03-08 RX ADMIN — Medication 1000 MILLIGRAM(S): at 18:40

## 2020-03-08 NOTE — ED PROVIDER NOTE - OBJECTIVE STATEMENT
no pmhx. states that she has been experiencing a frontal headache, nasal congestion and cough over the past 3 days. no fevers, but associated chills. states that cough is worse when she lays down on her back at night. states that she is concered for corona virus- states that she takes public transportation and unknown if she has been in contact with anyone sick, but is concerned. no recent foreign travel. does not enodrse chest pain palpitations or shortness of breath. taking tylenol with mild relief of symptoms. no pmhx. states that she has been experiencing a frontal headache, nasal congestion and cough over the past 3 days. no fevers, but associated chills. states that cough is worse when she lays down on her back at night. states cough is "flesh like color and small red speckle". states that she is concerned for corona virus- states that she takes public transportation and unknown if she has been in contact with anyone sick, but is concerned. no recent foreign travel. does not endorse chest pain palpitations or shortness of breath. taking tylenol with mild relief of symptoms.

## 2020-03-08 NOTE — ED ADULT TRIAGE NOTE - CHIEF COMPLAINT QUOTE
Patient complaining of cough, HA, nausea, chills and body aches for three days.  Patient denies any vomiting, diarrhea, fevers or any other complaints at this time. Patient complaining of cough, HA, nausea, chills and body aches for three days.  Patient also complaining of nosebleed last night.  Patient denies any blood thinners, vomiting, diarrhea, fevers or any other complaints at this time.

## 2020-03-08 NOTE — ED ADULT NURSE NOTE - CHIEF COMPLAINT QUOTE
Patient complaining of cough, HA, nausea, chills and body aches for three days.  Patient also complaining of nosebleed last night.  Patient denies any blood thinners, vomiting, diarrhea, fevers or any other complaints at this time.

## 2020-03-08 NOTE — ED PROVIDER NOTE - PATIENT PORTAL LINK FT
You can access the FollowMyHealth Patient Portal offered by [facility] by registering at the following website: [email or web address]. By joining [employer]’s FollowMyHealth portal, you will also be able to view your health information using other applications (apps) compatible with our system.

## 2020-03-08 NOTE — ED PROVIDER NOTE - NSFOLLOWUPINSTRUCTIONS_ED_ALL_ED_FT
Englishani    Sinusitis, Adult  Sinusitis is soreness and swelling (inflammation) of your sinuses. Sinuses are hollow spaces in the bones around your face. They are located:  Around your eyes.In the middle of your forehead.Behind your nose.In your cheekbones.Your sinuses and nasal passages are lined with a fluid called mucus. Mucus drains out of your sinuses. Swelling can trap mucus in your sinuses. This lets germs (bacteria, virus, or fungus) grow, which leads to infection. Most of the time, this condition is caused by a virus.  What are the causes?  This condition is caused by:  Allergies. Asthma. Germs.Things that block your nose or sinuses.Growths in the nose (nasal polyps).Chemicals or irritants in the air.Fungus (rare).What increases the risk?  You are more likely to develop this condition if:  You have a weak body defense system (immune system).You do a lot of swimming or diving.You use nasal sprays too much.You smoke.What are the signs or symptoms?  The main symptoms of this condition are pain and a feeling of pressure around the sinuses. Other symptoms include:  Stuffy nose (congestion).Runny nose (drainage).Swelling and warmth in the sinuses.Headache. Toothache.A cough that may get worse at night.Mucus that collects in the throat or the back of the nose (postnasal drip).Being unable to smell and taste.Being very tired (fatigue).A fever.Sore throat.Bad breath. How is this diagnosed?  This condition is diagnosed based on:  Your symptoms. Your medical history. A physical exam. Tests to find out if your condition is short-term (acute) or long-term (chronic). Your doctor may:  Check your nose for growths (polyps).Check your sinuses using a tool that has a light (endoscope).Check for allergies or germs.Do imaging tests, such as an MRI or CT scan.How is this treated?  Treatment for this condition depends on the cause and whether it is short-term or long-term.  If caused by a virus, your symptoms should go away on their own within 10 days. You may be given medicines to relieve symptoms. They include:  Medicines that shrink swollen tissue in the nose. Medicines that treat allergies (antihistamines). A spray that treats swelling of the nostrils. Rinses that help get rid of thick mucus in your nose (nasal saline washes). If caused by bacteria, your doctor may wait to see if you will get better without treatment. You may be given antibiotic medicine if you have:  A very bad infection.A weak body defense system.If caused by growths in the nose, you may need to have surgery.Follow these instructions at home:  Medicines     Take, use, or apply over-the-counter and prescription medicines only as told by your doctor. These may include nasal sprays.If you were prescribed an antibiotic medicine, take it as told by your doctor. Do not stop taking the antibiotic even if you start to feel better.Hydrate and humidify        Drink enough water to keep your pee (urine) pale yellow.Use a cool mist humidifier to keep the humidity level in your home above 50%.Breathe in steam for 10–15 minutes, 3–4 times a day, or as told by your doctor. You can do this in the bathroom while a hot shower is running.Try not to spend time in cool or dry air.Rest     Rest as much as you can.Sleep with your head raised (elevated).Make sure you get enough sleep each night.General instructions        Put a warm, moist washcloth on your face 3–4 times a day, or as often as told by your doctor. This will help with discomfort.Wash your hands often with soap and water. If there is no soap and water, use hand .Do not smoke. Avoid being around people who are smoking (secondhand smoke).Keep all follow-up visits as told by your doctor. This is important.Contact a doctor if:  You have a fever.Your symptoms get worse.Your symptoms do not get better within 10 days.Get help right away if:  You have a very bad headache.You cannot stop throwing up (vomiting).You have very bad pain or swelling around your face or eyes.You have trouble seeing.You feel confused.Your neck is stiff.You have trouble breathing.Summary  Sinusitis is swelling of your sinuses. Sinuses are hollow spaces in the bones around your face.This condition is caused by tissues in your nose that become inflamed or swollen. This traps germs. These can lead to infection.If you were prescribed an antibiotic medicine, take it as told by your doctor. Do not stop taking it even if you start to feel better.Keep all follow-up visits as told by your doctor. This is important.This information is not intended to replace advice given to you by your health care provider. Make sure you discuss any questions you have with your health care provider.    Document Released: 06/05/2009 Document Revised: 05/20/2019 Document Reviewed: 05/20/2019  [employer] Interactive Patient Education © 2020 [employer] Sinusitis, Adult  Sinusitis is soreness and swelling (inflammation) of your sinuses. Sinuses are hollow spaces in the bones around your face. They are located:  Around your eyes.In the middle of your forehead.Behind your nose.In your cheekbones.Your sinuses and nasal passages are lined with a fluid called mucus. Mucus drains out of your sinuses. Swelling can trap mucus in your sinuses. This lets germs (bacteria, virus, or fungus) grow, which leads to infection. Most of the time, this condition is caused by a virus.  What are the causes?  This condition is caused by:  Allergies. Asthma. Germs.Things that block your nose or sinuses.Growths in the nose (nasal polyps).Chemicals or irritants in the air.Fungus (rare).What increases the risk?  You are more likely to develop this condition if:  You have a weak body defense system (immune system).You do a lot of swimming or diving.You use nasal sprays too much.You smoke.What are the signs or symptoms?  The main symptoms of this condition are pain and a feeling of pressure around the sinuses. Other symptoms include:  Stuffy nose (congestion).Runny nose (drainage).Swelling and warmth in the sinuses.Headache. Toothache.A cough that may get worse at night.Mucus that collects in the throat or the back of the nose (postnasal drip).Being unable to smell and taste.Being very tired (fatigue).A fever.Sore throat.Bad breath. How is this diagnosed?  This condition is diagnosed based on:  Your symptoms. Your medical history. A physical exam. Tests to find out if your condition is short-term (acute) or long-term (chronic). Your doctor may:  Check your nose for growths (polyps).Check your sinuses using a tool that has a light (endoscope).Check for allergies or germs.Do imaging tests, such as an MRI or CT scan.How is this treated?  Treatment for this condition depends on the cause and whether it is short-term or long-term.  If caused by a virus, your symptoms should go away on their own within 10 days. You may be given medicines to relieve symptoms. They include:  Medicines that shrink swollen tissue in the nose. Medicines that treat allergies (antihistamines). A spray that treats swelling of the nostrils. Rinses that help get rid of thick mucus in your nose (nasal saline washes). If caused by bacteria, your doctor may wait to see if you will get better without treatment. You may be given antibiotic medicine if you have:  A very bad infection.A weak body defense system.If caused by growths in the nose, you may need to have surgery.Follow these instructions at home:  Medicines     Take, use, or apply over-the-counter and prescription medicines only as told by your doctor. These may include nasal sprays.If you were prescribed an antibiotic medicine, take it as told by your doctor. Do not stop taking the antibiotic even if you start to feel better.Hydrate and humidify        Drink enough water to keep your pee (urine) pale yellow.Use a cool mist humidifier to keep the humidity level in your home above 50%.Breathe in steam for 10–15 minutes, 3–4 times a day, or as told by your doctor. You can do this in the bathroom while a hot shower is running.Try not to spend time in cool or dry air.Rest     Rest as much as you can.Sleep with your head raised (elevated).Make sure you get enough sleep each night.General instructions        Put a warm, moist washcloth on your face 3–4 times a day, or as often as told by your doctor. This will help with discomfort.Wash your hands often with soap and water. If there is no soap and water, use hand .Do not smoke. Avoid being around people who are smoking (secondhand smoke).Keep all follow-up visits as told by your doctor. This is important.Contact a doctor if:  You have a fever.Your symptoms get worse.Your symptoms do not get better within 10 days.Get help right away if:  You have a very bad headache.You cannot stop throwing up (vomiting).You have very bad pain or swelling around your face or eyes.You have trouble seeing.You feel confused.Your neck is stiff.You have trouble breathing.Summary  Sinusitis is swelling of your sinuses. Sinuses are hollow spaces in the bones around your face.This condition is caused by tissues in your nose that become inflamed or swollen. This traps germs. These can lead to infection.If you were prescribed an antibiotic medicine, take it as told by your doctor. Do not stop taking it even if you start to feel better.Keep all follow-up visits as told by your doctor. This is important.This information is not intended to replace advice given to you by your health care provider. Make sure you discuss any questions you have with your health care provider.    Document Released: 06/05/2009 Document Revised: 05/20/2019 Document Reviewed: 05/20/2019  [employer] Interactive Patient Education © 2020 [employer]

## 2020-03-08 NOTE — ED PROVIDER NOTE - PHYSICAL EXAMINATION
General: Patient is well developed and well nourised. Patient is alert and oriented to person, place and date. Patient is laying comfortably in stretcher and appears in no acute distress.  HEENT: b/l erythematous nares with nasal congetion (clear). Head is normocephalic and atraumatic. Pupils are equal, round and reactive to light and accommodation. Extraocular movements intact. No evidence of nystagmus, conjunctival injection, or scleral icterus. External ears symmetric and non-tender without evidence of discharge. Ear canals are clear without evidence of edema or erythema. Cone of light evident on tympanic membrade without evidence of erythema, retraction or bulge. No hemotympanum present bilaterally. Teeth in good repair. Uvula midline. Pharynx without erythema, edema, tonsillar enlargement or exudates.   Neck: Supple and nontender, with no evidence of lymphadenopathy. No cervical spinal tenderness. Full range of motion.  Heart: Regular rate and rhythm. No murmurs, rubs or gallops.   Lungs: Clear to auscultation bilaterally with equal chest expansion. No note of wheezes, rhonchi, rales. Equal chest expansion. No note of retractions.  Neuro: Cranial nerves intact. GCS 15. Moving all extremities without discomfort. Sensation intact. Gait steady  Skin: Warm, dry and intact without evidence of rashes, bruising, pallor, jaundice or cyanosis.   Psych: Mood and affect appropriate. General: Patient is well developed and well nourished. Patient is alert and oriented to person, place and date. Patient is laying comfortably in stretcher and appears in no acute distress.  HEENT: b/l erythematous nares with nasal congestion (clear). Head is normocephalic and atraumatic. Pupils are equal, round and reactive to light and accommodation. Extraocular movements intact. No evidence of nystagmus, conjunctival injection, or scleral icterus. External ears symmetric and non-tender without evidence of discharge. Ear canals are clear without evidence of edema or erythema. Cone of light evident on tympanic membrane without evidence of erythema, retraction or bulge. No hemotympanum present bilaterally. Teeth in good repair. Uvula midline. Pharynx without erythema, edema, tonsillar enlargement or exudates.   Neck: Supple and nontender, with no evidence of lymphadenopathy. No cervical spinal tenderness. Full range of motion.  Heart: Regular rate and rhythm. No murmurs, rubs or gallops.   Lungs: Clear to auscultation bilaterally with equal chest expansion. No note of wheezes, rhonchi, rales. Equal chest expansion. No note of retractions.  Neuro: Cranial nerves intact. GCS 15. Moving all extremities without discomfort. Sensation intact. Gait steady  Skin: Warm, dry and intact without evidence of rashes, bruising, pallor, jaundice or cyanosis.   Psych: Mood and affect appropriate.

## 2020-03-08 NOTE — ED PROVIDER NOTE - CLINICAL SUMMARY MEDICAL DECISION MAKING FREE TEXT BOX
This is a pleasant 69 year old female no pmhx presenting to the ed with cough and flu like symptoms over the past 3 days. no fevers. states that she is concerned for COVID19 and unknown exposure because she takes public transportation. no recent travel. PE patient appears well, non-toxic. ttp frontal sinus. nares b/l erythematous with clear discharge.  lungs are CTA. plan for viral swabs, chest xray and medication. although COVID19 and endemic area, discussed with patient testing is not currently available for symptoms presenting. reassurance of absence of fever as well as shortness of breath. This is a pleasant 69 year old female no pmhx presenting to the ed with cough and flu like symptoms over the past 3 days. no fevers. states that she is concerned for COVID19 and unknown exposure because she takes public transportation. no recent travel. PE patient appears well, non-toxic. ttp frontal sinus. nares b/l erythematous with clear discharge.  lungs are CTA. plan for viral swabs, chest xray and medication. although COVID19 and endemic area, discussed with patient testing is not currently available for symptoms presenting. reassurance of absence of fever as well as shortness of breath. cxr reviewed, no pneumonia. Dr. Collins to see patient, recommend dc home with augmentin and follow up in office tomorrow. ED evaluation and management discussed with the patient and family (if available) in detail.  Close PMD follow up encouraged.  Strict ED return instructions discussed in detail and patient given the opportunity to ask any questions about their discharge diagnosis and instructions. Patient verbalized understanding. Patient is agreeable to plan.

## 2020-03-08 NOTE — ED PROVIDER NOTE - DIAGNOSTIC INTERPRETATION
ER PA: Bing Austin  CHEST XRAY INTERPRETATION: lungs clear, heart shadow normal, bony structures intact

## 2020-03-08 NOTE — ED ADULT NURSE NOTE - OBJECTIVE STATEMENT
70 y/o female presents to ED reporting cough, subjective fever/chills, nausea, HA x3 days. Reports nosebleed last night. Denies recent travel, no shortness of breath. C/o rib pain when coughing. Able to speak in full sentences.

## 2021-01-20 ENCOUNTER — APPOINTMENT (OUTPATIENT)
Dept: GYNECOLOGIC ONCOLOGY | Facility: CLINIC | Age: 71
End: 2021-01-20
Payer: COMMERCIAL

## 2021-01-20 ENCOUNTER — LABORATORY RESULT (OUTPATIENT)
Age: 71
End: 2021-01-20

## 2021-01-20 ENCOUNTER — NON-APPOINTMENT (OUTPATIENT)
Age: 71
End: 2021-01-20

## 2021-01-20 VITALS
HEIGHT: 69.5 IN | DIASTOLIC BLOOD PRESSURE: 71 MMHG | TEMPERATURE: 97.1 F | HEART RATE: 72 BPM | SYSTOLIC BLOOD PRESSURE: 112 MMHG | WEIGHT: 129 LBS | BODY MASS INDEX: 18.68 KG/M2 | OXYGEN SATURATION: 99 %

## 2021-01-20 DIAGNOSIS — Z80.0 FAMILY HISTORY OF MALIGNANT NEOPLASM OF DIGESTIVE ORGANS: ICD-10-CM

## 2021-01-20 DIAGNOSIS — N85.9 NONINFLAMMATORY DISORDER OF UTERUS, UNSPECIFIED: ICD-10-CM

## 2021-01-20 DIAGNOSIS — Z00.00 ENCOUNTER FOR GENERAL ADULT MEDICAL EXAMINATION W/OUT ABNORMAL FINDINGS: ICD-10-CM

## 2021-01-20 PROCEDURE — 99072 ADDL SUPL MATRL&STAF TM PHE: CPT

## 2021-01-20 PROCEDURE — 99203 OFFICE O/P NEW LOW 30 MIN: CPT

## 2021-01-20 RX ORDER — ESTRADIOL 0.1 MG/G
CREAM VAGINAL
Refills: 0 | Status: COMPLETED | COMMUNITY
End: 2021-01-20

## 2021-01-20 RX ORDER — MULTIVIT-MINS NO.63/IRON/FOLIC 27 MG-1 MG
TABLET ORAL
Refills: 0 | Status: COMPLETED | COMMUNITY
End: 2021-01-20

## 2021-01-20 NOTE — DISCUSSION/SUMMARY
[FreeTextEntry1] : 7mm of endometrial fluid without any symptoms\par Not concerning, would follow up for PMB or thickened endometrial lining\par Fluid like due to cervical stenosis\par Pap/HPV done today\par follow up prn

## 2021-01-20 NOTE — HISTORY OF PRESENT ILLNESS
[FreeTextEntry1] : Problem\par 1) FLuid in Endometrium\par \par Previous Therapy\par 1) Pelvic US 1/13/2021\par    a) Uterus 6.8cm, endometrial complex 2mm 7mm finding of new intracavitary fluid\par \par 69 yo referred by Dr. Collins for new finding of 7mm endometrial fluid on US. Patient noticed a rash on her abdomen so Dr. Collins ordered an abdominal and pelvic US which demonstrated the fluid. No pelvic pain or PMB. Still has rash intermittently. Otherwise feeling well.

## 2021-01-20 NOTE — PAST MEDICAL HISTORY
[Postmenopausal] : The patient is postmenopausal [Definite ___ (Date)] : the last menstrual period was [unfilled] [Total Preg ___] : G[unfilled] [Living ___] : Living: [unfilled] [FreeTextEntry5] : Vaginal delivery x1

## 2021-01-27 LAB — HPV HIGH+LOW RISK DNA PNL CVX: DETECTED

## 2021-06-10 NOTE — ED ADULT TRIAGE NOTE - NSTRIAGECARE_GEN_A_ER
ASSESSMENT/PLAN:  1. Routine general medical examination at a health care facility  We discussed healthy lifestyle, nutrition, cardiovascular risk reduction, self care, safety, sunscreen, and seatbelt screening.  Health maintenance screening and immunizations reviewed with the patient.    2. Colloid Nontoxic Multinodular Goiter  Per endocrinology    3. Dyslipidemia  Not on meds  - Lipid Cascade  - Glucose    4. Osteoporosis  She used hormone therapy intermittently in the past. No h/o fractures and maintains a calcium intake of about 1000 mg a day. She does also use a multivitamin but no separate vitamin D. She exercises regularly with swimming and walking. She used risedronate, ibandronate and alendronate for a few months each in the past.  - DXA Bone Density Scan; Future    5. H/o Elevated lipase  No chest pain, no abdominal pain  - Lipase    SUBJECTIVE:  Melisa Horn is a 70 y.o. female who is here for a health maintenance visit. Her blood pressure and pulse are within normal limits. Her BMI is 20. She is fasting today. She is due for a DXA scan, and her DXA scan in 2015 showed osteoporosis. Her mammogram and colonoscopy are up to date. Her immunizations are up to date.     Thyroid Nodule: She has a stable thyroid nodule. She did have it biopsied in August 2016, and she saw endocrinology. The endocrinologist recommended yearly ultra sound, as the nodule is benign and stable. Her TSH has always been within limits.       REVIEW OF SYSTEMS:   She is taking a calcium supplement, but NOT a vitamin D supplement. She has only had one Prolia injection in 2015, but stopped due to side effects. She notes that she went to an urgent care for chest symptoms, and had some labs drawn. Her lipase was found to be on the high side of normal. No problems with swallowing. She did see dermatology for the skin spot on her face, and it was frozen off. Her bowel and bladder functions are stable. She does take a daily fiber. All  "other systems are negative.    PFSH:  No new history.     History   Smoking Status     Never Smoker   Smokeless Tobacco     Not on file       Family History   Problem Relation Age of Onset     Cancer Maternal Grandfather      esophogeal     Breast cancer Paternal Grandmother        Past Surgical History:   Procedure Laterality Date     BREAST BIOPSY Left     2007 benign       Allergies   Allergen Reactions     Alendronate      Erythromycin Base Nausea Only     Penicillins      Risedronate          OBJECTIVE:   Physical Exam   Vitals:    05/02/17 0939   BP: 126/78   Patient Site: Left Arm   Patient Position: Sitting   Cuff Size: Adult Regular   Pulse: 80   Weight: 114 lb 9 oz (52 kg)   Height: 5' 2.25\" (1.581 m)     Estimated body mass index is 20.79 kg/(m^2) as calculated from the following:    Height as of this encounter: 5' 2.25\" (1.581 m).    Weight as of this encounter: 114 lb 9 oz (52 kg).    Constitutional: Patient is oriented to person, place, and time. Patient appears well-developed and well-nourished. No distress.   Head: Normocephalic and atraumatic.   Right Ear: External ear normal. Ear canal and TM normal.   Left Ear: External ear normal. Ear canal and TM normal.   Nose: Nose normal.   Mouth/Throat: Oropharynx is clear and moist. No oropharyngeal exudate.   Eyes: Conjunctivae and EOM are normal. Pupils are equal, round, and reactive to light. Right eye exhibits no discharge. Left eye exhibits no discharge. No scleral icterus.   Neck: Neck supple. No JVD present. No tracheal deviation present. There is fullness on right sided neck.  Breasts:  Normal appearing, no skin involvement, no palpable mass, no tenderness on palpation.  No axillary involvement  Cardiovascular: Normal rate, regular rhythm, normal heart sounds and intact distal pulses. No murmur heard.   Pulmonary/Chest: Effort normal and breath sounds normal. No stridor. No respiratory distress. Patient has no wheezes, no rales, exhibits no " tenderness.   Abdominal: Soft. Bowel sounds are normal. Patient exhibits no distension and no mass. There is no tenderness. There is no rebound and no guarding.   Lymphadenopathy:  Patient has no cervical adenopathy.   Neurological: Patient is alert and oriented to person, place, and time. Patient has normal reflexes. No cranial nerve deficit. Coordination normal.   Skin: Skin is warm and dry. No rash noted. Patient is not diaphoretic. No erythema. No pallor.   Psychiatric: Patient has good eye contact without any psychomotor retardation or stereotypic behaviors.  normal mood and affect. Judgment and thought content normal.   Speech is regular rate and rhythm.           ADDITIONAL HISTORY SUMMARIZED (2): Reviewed note from Dr. Manzano from 8/18/2016 regarding thyroid nodule.  DECISION TO OBTAIN EXTRA INFORMATION (1): None.   RADIOLOGY TESTS (1): Reviewed thyroid US from 8/11/2016.  LABS (1): Reviewed and ordered labs today.  MEDICINE TESTS (1): None.  INDEPENDENT REVIEW (2 each): None.     The visit lasted a total of 21 minutes face to face with the patient. Over 50% of the time was spent counseling and educating the patient about health maintenance.    IMarlene, am scribing for and in the presence of, Dr. Rodriguez.    IDr. Rodriguez, personally performed the services described in this documentation, as scribed by Marlene Al in my presence, and it is both accurate and complete.      MEDICATIONS:  Current Outpatient Prescriptions   Medication Sig Dispense Refill     acetaminophen-caffeine (EXCEDRIN) 500-65 mg Tab per tablet Take 1 tablet by mouth every 6 (six) hours as needed.       CALCIUM CITRATE, BULK, MISC Use 1 tablet As Directed daily. 150 mg calcium 75 magnesium       ibuprofen (ADVIL,MOTRIN) 200 MG tablet Take 200 mg by mouth every 6 (six) hours as needed for pain.       INULIN (FIBER GUMMIES ORAL) Take 1 tablet by mouth daily.       LACTASE (LACTAID ORAL) Take 1 tablet by mouth daily.  When having dairy       MULTIVITAMIN (MULTIPLE VITAMINS ORAL) Take by mouth. 1000 vit D, 200 calcium       POLYCARBOPHIL (REPLENS VAGL) Insert 1 application into the vagina. Use as directed       Current Facility-Administered Medications   Medication Dose Route Frequency Provider Last Rate Last Dose     denosumab 60 mg (PROLIA 60 mg/ml)  60 mg Subcutaneous Q6 Months Miriam Denney MD   60 mg at 08/13/15 1008         Total data points: 4     Face Mask

## 2022-06-22 NOTE — ED ADULT NURSE NOTE - CAS DISCH ACCOMP BY
Chief Complaint   Patient presents with     COMPREHENSIVE EYE EXAM      Accompanied by  who is acting as her   Last Eye Exam:  First eye exam  Dilated Previously: No, side effects of dilation explained today    What are you currently using to see?  does not use glasses or contacts       Distance Vision Acuity: Noticed gradual change in both eyes    Near Vision Acuity: she has to hold thing close to read    Eye Comfort: good  Do you use eye drops? : No  Occupation or Hobbies: homemaker    Em Lampreccarlene  Optometric Assistant, [facility]            Medical, surgical and family histories reviewed and updated 6/22/2022.       OBJECTIVE: See Ophthalmology exam    ASSESSMENT:    ICD-10-CM    1. Regular astigmatism of both eyes - Both Eyes  H52.223 EYE EXAM (SIMPLE-NONBILLABLE)     REFRACTION   2. Hyperopia, bilateral - Both Eyes  H52.03 EYE EXAM (SIMPLE-NONBILLABLE)     REFRACTION       PLAN:     Patient Instructions   Astigmatism results from curvature differential in the cornea and crystalline lens which can cause a distorted image, as light rays are prevented from meeting at a common focus.     Hyperopia is far-sightedness. Hyperopia is commonly rooted from a petite eye length. That results in the light's focus behind the retina.     Eyeglass prescription given.    The affects of the dilating drops last for 4- 6 hours.  You will be more sensitive to light and vision will be blurry up close.  Do not drive if you do not feel comfortable.  Mydriatic sunglasses were given if needed.    Recommend annual eye exams.    Diana Rahman O.D.  [address] [address]    [phone]        Spouse

## 2022-08-03 ENCOUNTER — NON-APPOINTMENT (OUTPATIENT)
Age: 72
End: 2022-08-03

## 2022-08-03 ENCOUNTER — APPOINTMENT (OUTPATIENT)
Dept: HEART AND VASCULAR | Facility: CLINIC | Age: 72
End: 2022-08-03

## 2022-08-03 VITALS
DIASTOLIC BLOOD PRESSURE: 69 MMHG | HEIGHT: 69 IN | BODY MASS INDEX: 18.81 KG/M2 | SYSTOLIC BLOOD PRESSURE: 116 MMHG | OXYGEN SATURATION: 97 % | WEIGHT: 127 LBS | HEART RATE: 72 BPM

## 2022-08-03 DIAGNOSIS — R06.09 OTHER FORMS OF DYSPNEA: ICD-10-CM

## 2022-08-03 PROCEDURE — 93000 ELECTROCARDIOGRAM COMPLETE: CPT

## 2022-08-03 PROCEDURE — 99214 OFFICE O/P EST MOD 30 MIN: CPT | Mod: 25

## 2022-09-06 ENCOUNTER — FORM ENCOUNTER (OUTPATIENT)
Age: 72
End: 2022-09-06

## 2022-09-07 ENCOUNTER — APPOINTMENT (OUTPATIENT)
Dept: CT IMAGING | Facility: HOSPITAL | Age: 72
End: 2022-09-07

## 2022-09-07 ENCOUNTER — OUTPATIENT (OUTPATIENT)
Dept: OUTPATIENT SERVICES | Facility: HOSPITAL | Age: 72
LOS: 1 days | End: 2022-09-07
Payer: COMMERCIAL

## 2022-09-07 DIAGNOSIS — Z98.890 OTHER SPECIFIED POSTPROCEDURAL STATES: Chronic | ICD-10-CM

## 2022-09-07 DIAGNOSIS — K02.7 DENTAL ROOT CARIES: Chronic | ICD-10-CM

## 2022-09-07 DIAGNOSIS — R06.09 OTHER FORMS OF DYSPNEA: ICD-10-CM

## 2022-09-07 PROCEDURE — 76376 3D RENDER W/INTRP POSTPROCES: CPT | Mod: 26

## 2022-09-07 PROCEDURE — 93306 TTE W/DOPPLER COMPLETE: CPT

## 2022-09-07 PROCEDURE — 93306 TTE W/DOPPLER COMPLETE: CPT | Mod: 26

## 2022-12-08 ENCOUNTER — APPOINTMENT (OUTPATIENT)
Dept: THORACIC SURGERY | Facility: CLINIC | Age: 72
End: 2022-12-08

## 2022-12-08 ENCOUNTER — NON-APPOINTMENT (OUTPATIENT)
Age: 72
End: 2022-12-08

## 2022-12-08 ENCOUNTER — RESULT REVIEW (OUTPATIENT)
Age: 72
End: 2022-12-08

## 2022-12-08 VITALS
HEIGHT: 69 IN | DIASTOLIC BLOOD PRESSURE: 66 MMHG | SYSTOLIC BLOOD PRESSURE: 111 MMHG | HEART RATE: 74 BPM | RESPIRATION RATE: 18 BRPM | OXYGEN SATURATION: 99 % | TEMPERATURE: 97.1 F | WEIGHT: 127 LBS | BODY MASS INDEX: 18.81 KG/M2

## 2022-12-08 DIAGNOSIS — M25.9 JOINT DISORDER, UNSPECIFIED: ICD-10-CM

## 2022-12-08 DIAGNOSIS — R59.0 LOCALIZED ENLARGED LYMPH NODES: ICD-10-CM

## 2022-12-08 PROCEDURE — 99203 OFFICE O/P NEW LOW 30 MIN: CPT

## 2022-12-09 PROBLEM — M25.9 DISORDER OF STERNOCLAVICULAR JOINT: Status: ACTIVE | Noted: 2022-12-09

## 2022-12-09 PROBLEM — R59.0 SUPRACLAVICULAR LYMPHADENOPATHY: Status: ACTIVE | Noted: 2019-06-26

## 2022-12-09 NOTE — HISTORY OF PRESENT ILLNESS
[FreeTextEntry1] : Patient is a 72 year old female, never smoker, with a past medical history of  HLD, benign thyroid nodule. She was referred by Dr. Collins. \par \par She was last seen in 7/10/19 by Dr. Martinez when she had a left periclavicular lesion. Underwent IR drainage and FNA which was non-diagnostic. Smear slides and cell block were acellular at that time. Patient was told she should return to the office to discuss possible resection. She preferred to wait and see if the cysts would return.\par \par She presents today with a concern of enlarging uriel-clavicular lesion. She denies fever, sore throat, cough, pain, or redness. \par \par CT chest completed on 6/28/19:\par - no change in 4 mm nodule in right lower lobe\par - crescentic shaped fluid attenuation structures noted anterior and superior to the left sternoclavicular joint and superior to the left clavicle\par \par LEFT supraclavicular nodule FNA completed on 7/10/19:\par - nondiagnostic, smear slides and cell block are acellular\par \par

## 2022-12-09 NOTE — ASSESSMENT
[FreeTextEntry1] : Patient is a 72 year old female, never smoker, with a past medical history of  HLD, benign thyroid nodule. She was referred by Dr. Collins. \par \par She was last seen in 7/10/19 by Dr. Martinez when she had a left periclavicular lesion. Underwent IR drainage and FNA which was non-diagnostic. Smear slides and cell block were acellular at that time. Patient was told she should return to the office to discuss possible resection. She preferred to wait and see if the cysts would return.\par \par The patient was examined and the mass is soft and cyst like and may represent a sternoclavicular joint ganglion cyst. We will obtain an MRI prior to resecting.the cyst to better its relationship to the joint.\par \par Plan:\par 1. Chest MRI +/- contrast to assess cyst's relationship to the joint\par \par JENNIFER Rocha, was scribe [and  if needed] for and in the presence of Dr. Leora Lundberg for the following sections: history of present illness, past medical/surgical/family/social/ allergy history, review of systems, vital signs, physical exam, and disposition.\par \par Leora LYNCH MD personally performed the services described in the documentation, reviewed the documentation recorded by the scribe in my presence and it accurately and completely records my words and actions. I have personally seen, examined, and participated in the care of this patient.  I have reviewed all pertinent clinical information, including history and physical exam and plan.  I agree with the above history, physical and plan of the ACP which I have reviewed and edited where appropriate.\par \par

## 2022-12-09 NOTE — PHYSICAL EXAM
[General Appearance - Alert] : alert [General Appearance - In No Acute Distress] : in no acute distress [Sclera] : the sclera and conjunctiva were normal [PERRL With Normal Accommodation] : pupils were equal in size, round, and reactive to light [Extraocular Movements] : extraocular movements were intact [Outer Ear] : the ears and nose were normal in appearance [Oropharynx] : the oropharynx was normal [FreeTextEntry1] : left SJ joint a soft, movable cyst noticed, around 4x4cm, nontender, no erythema [] : no respiratory distress [Auscultation Breath Sounds / Voice Sounds] : lungs were clear to auscultation bilaterally [Heart Rate And Rhythm] : heart rate was normal and rhythm regular [Heart Sounds] : normal S1 and S2 [Heart Sounds Gallop] : no gallops [Murmurs] : no murmurs [Heart Sounds Pericardial Friction Rub] : no pericardial rub [Deep Tendon Reflexes (DTR)] : deep tendon reflexes were 2+ and symmetric [Sensation] : the sensory exam was normal to light touch and pinprick [No Focal Deficits] : no focal deficits [Oriented To Time, Place, And Person] : oriented to person, place, and time [Impaired Insight] : insight and judgment were intact [Affect] : the affect was normal

## 2022-12-15 NOTE — ED ADULT NURSE REASSESSMENT NOTE - GENERAL PATIENT STATE
Specialty Pharmacy - Refill Coordination    Specialty Medication Orders Linked to Encounter      Flowsheet Row Most Recent Value   Medication #1 somatropin (NORDITROPIN FLEXPRO) 10 mg/1.5 mL (6.7 mg/mL) PnIj (Order#[id number], Rx#[id number])            Refill Questions - Documented Responses      Flowsheet Row Most Recent Value   Patient Availability and HIPAA Verification    Does patient want to proceed with activity? Yes   HIPAA/medical authority confirmed? Yes   Relationship to patient of person spoken to? Mother   Refill Screening Questions    Changes to allergies? No   Changes to medications? No   New conditions since last clinic visit? No   Unplanned office visit, urgent care, ED, or hospital admission in the last 4 weeks? No   How does patient/caregiver feel medication is working? Good   Financial problems or insurance changes? No   How many doses of your specialty medications were missed in the last 4 weeks? 0   Would patient like to speak to a pharmacist? No   When does the patient need to receive the medication? 12/21/22   Refill Delivery Questions    How will the patient receive the medication? Mail   When does the patient need to receive the medication? 12/21/22   Shipping Address Home   Address in [facility] confirmed and updated if neccessary? Yes   Expected Copay ($) 0   Is the patient able to afford the medication copay? Yes   Payment Method zero copay   Days supply of Refill 29   Supplies needed? No supplies needed   Refill activity completed? Yes   Refill activity plan Refill scheduled   Shipment/Pickup Date: 12/19/22            Current Outpatient Medications   Medication Sig    cetirizine HCl (ZYRTEC ORAL) Take by mouth daily as needed.    ergocalciferol (ERGOCALCIFEROL) 50,000 unit Cap Take 1 capsule (50,000 Units total) by mouth every 7 days.    hydrocortisone (CORTEF) 5 MG Tab Take 1 tablet (5 mg) by mouth every morning, HALF of a tablet (2.5 mg) in the afternoon, and 1 FULL tablet (5  mg) at bedtime.  Triple each dose in case of stress.  Additional tablets for stress dosing.    levothyroxine (SYNTHROID) 137 MCG Tab tablet Take 0.5 tablets (68.5 mcg total) by mouth once daily.    multivitamin (THERAGRAN) per tablet Take by mouth.    somatropin (NORDITROPIN FLEXPRO) 10 mg/1.5 mL (6.7 mg/mL) PnIj Inject 1.7 mg into the skin once daily.    testosterone cypionate 200 mg/mL Kit Inject 0.25 mLs into the muscle every 30 days.   Last reviewed on 10/28/2022  9:01 AM by Margo Edmonds PAGlynnC    Review of patient's allergies indicates:  No Known Allergies Last reviewed on  10/28/2022 9:01 AM by Margo Edmonds    Interventions added this encounter   Open: OSP Care Plan: somatropin (NORDITROPIN FLEXPRO) 10 mg/1.5 mL (6.7 mg/mL) PnIj     Tasks added this encounter   No tasks added.   Tasks due within next 3 months   11/29/2022 - Refill Call (Auto Added)     Higinio Donohue, PharmD  [facility]  [address] [place] [address]  Phone: [phone]  Fax: [phone]         family/SO at bedside/improvement verbalized/cooperative/smiling/interactive/comfortable appearance

## 2022-12-16 ENCOUNTER — OUTPATIENT (OUTPATIENT)
Dept: OUTPATIENT SERVICES | Facility: HOSPITAL | Age: 72
LOS: 1 days | End: 2022-12-16
Payer: COMMERCIAL

## 2022-12-16 ENCOUNTER — APPOINTMENT (OUTPATIENT)
Dept: MRI IMAGING | Facility: HOSPITAL | Age: 72
End: 2022-12-16

## 2022-12-16 DIAGNOSIS — Z98.890 OTHER SPECIFIED POSTPROCEDURAL STATES: Chronic | ICD-10-CM

## 2022-12-16 DIAGNOSIS — K02.7 DENTAL ROOT CARIES: Chronic | ICD-10-CM

## 2022-12-16 PROCEDURE — 71046 X-RAY EXAM CHEST 2 VIEWS: CPT | Mod: 26

## 2022-12-16 PROCEDURE — 71046 X-RAY EXAM CHEST 2 VIEWS: CPT

## 2022-12-22 ENCOUNTER — APPOINTMENT (OUTPATIENT)
Dept: THORACIC SURGERY | Facility: CLINIC | Age: 72
End: 2022-12-22

## 2022-12-30 ENCOUNTER — OUTPATIENT (OUTPATIENT)
Dept: OUTPATIENT SERVICES | Facility: HOSPITAL | Age: 72
LOS: 1 days | End: 2022-12-30
Payer: COMMERCIAL

## 2022-12-30 DIAGNOSIS — K02.7 DENTAL ROOT CARIES: Chronic | ICD-10-CM

## 2022-12-30 DIAGNOSIS — Z98.890 OTHER SPECIFIED POSTPROCEDURAL STATES: Chronic | ICD-10-CM

## 2022-12-30 PROCEDURE — 71130 X-RAY STRENOCLAVIC JT 3/>VWS: CPT

## 2022-12-30 PROCEDURE — 71130 X-RAY STRENOCLAVIC JT 3/>VWS: CPT | Mod: 26

## 2023-01-10 ENCOUNTER — OUTPATIENT (OUTPATIENT)
Dept: OUTPATIENT SERVICES | Facility: HOSPITAL | Age: 73
LOS: 1 days | End: 2023-01-10
Payer: COMMERCIAL

## 2023-01-10 ENCOUNTER — APPOINTMENT (OUTPATIENT)
Dept: MRI IMAGING | Facility: HOSPITAL | Age: 73
End: 2023-01-10
Payer: COMMERCIAL

## 2023-01-10 DIAGNOSIS — K02.7 DENTAL ROOT CARIES: Chronic | ICD-10-CM

## 2023-01-10 DIAGNOSIS — Z98.890 OTHER SPECIFIED POSTPROCEDURAL STATES: Chronic | ICD-10-CM

## 2023-01-10 PROCEDURE — 73221 MRI JOINT UPR EXTREM W/O DYE: CPT

## 2023-01-10 PROCEDURE — 73221 MRI JOINT UPR EXTREM W/O DYE: CPT | Mod: 26,LT

## 2023-01-19 ENCOUNTER — OUTPATIENT (OUTPATIENT)
Dept: OUTPATIENT SERVICES | Facility: HOSPITAL | Age: 73
LOS: 1 days | End: 2023-01-19
Payer: COMMERCIAL

## 2023-01-19 ENCOUNTER — APPOINTMENT (OUTPATIENT)
Dept: THORACIC SURGERY | Facility: CLINIC | Age: 73
End: 2023-01-19
Payer: COMMERCIAL

## 2023-01-19 ENCOUNTER — NON-APPOINTMENT (OUTPATIENT)
Age: 73
End: 2023-01-19

## 2023-01-19 VITALS
SYSTOLIC BLOOD PRESSURE: 122 MMHG | RESPIRATION RATE: 18 BRPM | TEMPERATURE: 97.5 F | WEIGHT: 127 LBS | HEIGHT: 69 IN | HEART RATE: 73 BPM | OXYGEN SATURATION: 96 % | BODY MASS INDEX: 18.81 KG/M2 | DIASTOLIC BLOOD PRESSURE: 70 MMHG

## 2023-01-19 DIAGNOSIS — Z01.818 ENCOUNTER FOR OTHER PREPROCEDURAL EXAMINATION: ICD-10-CM

## 2023-01-19 DIAGNOSIS — Z98.890 OTHER SPECIFIED POSTPROCEDURAL STATES: Chronic | ICD-10-CM

## 2023-01-19 DIAGNOSIS — K02.7 DENTAL ROOT CARIES: Chronic | ICD-10-CM

## 2023-01-19 DIAGNOSIS — J98.59 OTHER DISEASES OF MEDIASTINUM, NOT ELSEWHERE CLASSIFIED: ICD-10-CM

## 2023-01-19 LAB
ALBUMIN SERPL ELPH-MCNC: 4.4 G/DL — SIGNIFICANT CHANGE UP (ref 3.3–5)
ALP SERPL-CCNC: 61 U/L — SIGNIFICANT CHANGE UP (ref 40–120)
ALT FLD-CCNC: 18 U/L — SIGNIFICANT CHANGE UP (ref 10–45)
ANION GAP SERPL CALC-SCNC: 11 MMOL/L — SIGNIFICANT CHANGE UP (ref 5–17)
APPEARANCE UR: CLEAR — SIGNIFICANT CHANGE UP
APTT BLD: 30.8 SEC — SIGNIFICANT CHANGE UP (ref 27.5–35.5)
AST SERPL-CCNC: 23 U/L — SIGNIFICANT CHANGE UP (ref 10–40)
BASOPHILS # BLD AUTO: 0.04 K/UL — SIGNIFICANT CHANGE UP (ref 0–0.2)
BASOPHILS NFR BLD AUTO: 0.8 % — SIGNIFICANT CHANGE UP (ref 0–2)
BILIRUB SERPL-MCNC: 0.3 MG/DL — SIGNIFICANT CHANGE UP (ref 0.2–1.2)
BILIRUB UR-MCNC: NEGATIVE — SIGNIFICANT CHANGE UP
BUN SERPL-MCNC: 12 MG/DL — SIGNIFICANT CHANGE UP (ref 7–23)
CALCIUM SERPL-MCNC: 10 MG/DL — SIGNIFICANT CHANGE UP (ref 8.4–10.5)
CHLORIDE SERPL-SCNC: 102 MMOL/L — SIGNIFICANT CHANGE UP (ref 96–108)
CO2 SERPL-SCNC: 25 MMOL/L — SIGNIFICANT CHANGE UP (ref 22–31)
COLOR SPEC: YELLOW — SIGNIFICANT CHANGE UP
CREAT SERPL-MCNC: 0.66 MG/DL — SIGNIFICANT CHANGE UP (ref 0.5–1.3)
DIFF PNL FLD: NEGATIVE — SIGNIFICANT CHANGE UP
EGFR: 94 ML/MIN/1.73M2 — SIGNIFICANT CHANGE UP
EOSINOPHIL # BLD AUTO: 0.05 K/UL — SIGNIFICANT CHANGE UP (ref 0–0.5)
EOSINOPHIL NFR BLD AUTO: 1 % — SIGNIFICANT CHANGE UP (ref 0–6)
GLUCOSE SERPL-MCNC: 60 MG/DL — LOW (ref 70–99)
GLUCOSE UR QL: NEGATIVE — SIGNIFICANT CHANGE UP
HCT VFR BLD CALC: 39.9 % — SIGNIFICANT CHANGE UP (ref 34.5–45)
HGB BLD-MCNC: 13.1 G/DL — SIGNIFICANT CHANGE UP (ref 11.5–15.5)
IMM GRANULOCYTES NFR BLD AUTO: 0 % — SIGNIFICANT CHANGE UP (ref 0–0.9)
INR BLD: 1.1 — SIGNIFICANT CHANGE UP (ref 0.88–1.16)
KETONES UR-MCNC: NEGATIVE — SIGNIFICANT CHANGE UP
LEUKOCYTE ESTERASE UR-ACNC: NEGATIVE — SIGNIFICANT CHANGE UP
LYMPHOCYTES # BLD AUTO: 1.79 K/UL — SIGNIFICANT CHANGE UP (ref 1–3.3)
LYMPHOCYTES # BLD AUTO: 35.1 % — SIGNIFICANT CHANGE UP (ref 13–44)
MCHC RBC-ENTMCNC: 31.4 PG — SIGNIFICANT CHANGE UP (ref 27–34)
MCHC RBC-ENTMCNC: 32.8 GM/DL — SIGNIFICANT CHANGE UP (ref 32–36)
MCV RBC AUTO: 95.7 FL — SIGNIFICANT CHANGE UP (ref 80–100)
MONOCYTES # BLD AUTO: 0.45 K/UL — SIGNIFICANT CHANGE UP (ref 0–0.9)
MONOCYTES NFR BLD AUTO: 8.8 % — SIGNIFICANT CHANGE UP (ref 2–14)
NEUTROPHILS # BLD AUTO: 2.77 K/UL — SIGNIFICANT CHANGE UP (ref 1.8–7.4)
NEUTROPHILS NFR BLD AUTO: 54.3 % — SIGNIFICANT CHANGE UP (ref 43–77)
NITRITE UR-MCNC: NEGATIVE — SIGNIFICANT CHANGE UP
NRBC # BLD: 0 /100 WBCS — SIGNIFICANT CHANGE UP (ref 0–0)
PH UR: 6 — SIGNIFICANT CHANGE UP (ref 5–8)
PLATELET # BLD AUTO: 311 K/UL — SIGNIFICANT CHANGE UP (ref 150–400)
POTASSIUM SERPL-MCNC: 3.8 MMOL/L — SIGNIFICANT CHANGE UP (ref 3.5–5.3)
POTASSIUM SERPL-SCNC: 3.8 MMOL/L — SIGNIFICANT CHANGE UP (ref 3.5–5.3)
PROT SERPL-MCNC: 7.2 G/DL — SIGNIFICANT CHANGE UP (ref 6–8.3)
PROT UR-MCNC: NEGATIVE MG/DL — SIGNIFICANT CHANGE UP
PROTHROM AB SERPL-ACNC: 13.1 SEC — SIGNIFICANT CHANGE UP (ref 10.5–13.4)
RBC # BLD: 4.17 M/UL — SIGNIFICANT CHANGE UP (ref 3.8–5.2)
RBC # FLD: 13.1 % — SIGNIFICANT CHANGE UP (ref 10.3–14.5)
SODIUM SERPL-SCNC: 138 MMOL/L — SIGNIFICANT CHANGE UP (ref 135–145)
SP GR SPEC: 1.02 — SIGNIFICANT CHANGE UP (ref 1–1.03)
UROBILINOGEN FLD QL: 0.2 E.U./DL — SIGNIFICANT CHANGE UP
WBC # BLD: 5.1 K/UL — SIGNIFICANT CHANGE UP (ref 3.8–10.5)
WBC # FLD AUTO: 5.1 K/UL — SIGNIFICANT CHANGE UP (ref 3.8–10.5)

## 2023-01-19 PROCEDURE — 85610 PROTHROMBIN TIME: CPT

## 2023-01-19 PROCEDURE — 80053 COMPREHEN METABOLIC PANEL: CPT

## 2023-01-19 PROCEDURE — 85025 COMPLETE CBC W/AUTO DIFF WBC: CPT

## 2023-01-19 PROCEDURE — 36415 COLL VENOUS BLD VENIPUNCTURE: CPT

## 2023-01-19 PROCEDURE — 93000 ELECTROCARDIOGRAM COMPLETE: CPT

## 2023-01-19 PROCEDURE — 81003 URINALYSIS AUTO W/O SCOPE: CPT

## 2023-01-19 PROCEDURE — 85730 THROMBOPLASTIN TIME PARTIAL: CPT

## 2023-01-19 PROCEDURE — 99215 OFFICE O/P EST HI 40 MIN: CPT

## 2023-01-19 NOTE — PHYSICAL EXAM
[Auscultation Breath Sounds / Voice Sounds] : lungs were clear to auscultation bilaterally [Examination Of The Chest] : the chest was normal in appearance [Chest Visual Inspection Thoracic Asymmetry] : no chest asymmetry [Diminished Respiratory Excursion] : normal chest expansion [Bowel Sounds] : normal bowel sounds [Abdomen Soft] : soft [Abdomen Tenderness] : non-tender [] : no hepato-splenomegaly [Abdomen Mass (___ Cm)] : no abdominal mass palpated [Deep Tendon Reflexes (DTR)] : deep tendon reflexes were 2+ and symmetric [Sensation] : the sensory exam was normal to light touch and pinprick [No Focal Deficits] : no focal deficits [Oriented To Time, Place, And Person] : oriented to person, place, and time [Impaired Insight] : insight and judgment were intact [Affect] : the affect was normal

## 2023-01-20 NOTE — DATA REVIEWED
[FreeTextEntry1] : CT chest completed on 6/28/19:\par - no change in 4 mm nodule in right lower lobe\par - crescentic shaped fluid attenuation structures noted anterior and superior to the left sternoclavicular joint and superior to the left clavicle\par \par LEFT supraclavicular nodule FNA completed on 7/10/19:\par - nondiagnostic, smear slides and cell block are acellular

## 2023-01-20 NOTE — ASSESSMENT
[FreeTextEntry1] : Patient is a 72 year old female, never smoker, with a past medical history of HLD, benign thyroid nodule. She was referred by Dr. Collins. \par \par She was seen in 7/10/19 by Dr. Martinez for a periclavicular lesion. Underwent IR drainage and FNA which was non-diagnostic. Smear slides and cell block were acellular at that time. Patient was told she should return to the office to discuss possible resection. She preferred to wait and see if the cysts would return.\par \par At her last visit on 12/8/22, the mass was thought to be a possible sternoclavicular joint ganglion cyst. She presents today with an MRI of the area to better understand the cyst's relationship to the joint, and to further discuss resection options. \par \par MRI reviewed, which revealed a cystic nodule within the subcutaneous tissues anterior to the left sternoclavicular joint which does not involve the sternoclavicular joint. Resection of the lesion is recommended. The risks and benefits of the Left upper chest cyst removal procedure were discussed at length including but not limited to risks of infection, bleeding, scarring, pain, as well as risks of anesthesia. \par \par Patient understood the conversation and all questions were answered. She wishes to proceed with the procedure. \par \par She will require PST labs, UA, EKG and Covid test prior to surgery. Will schedule procedure on 02/07/2023. \par \par Plan:\par 1.PST labs, UA, EKG (Done today)\par 2.Covid test 3-5 days prior the procedure \par 3.Left upper chest cyst removal on 02/07/2023\par \par Justin LYNCH RN, am scribing for and the presence of Dr. Leora Lundberg  the following sections: history of present illness, past medical/family/surgical/family/social history, review of systems, vital signs, physical exam, and disposition.\par \par Leora LYNCH MD personally performed the services described in the documentation, reviewed the documentation recorded by the scribe in my presence and it accurately and completely records my words and actions. I have personally seen, examined, and participated in the care of this patient.  I have reviewed all pertinent clinical information, including history and physical exam and plan.  I agree with the above history, physical and plan of the ACP which I have reviewed and edited where appropriate.

## 2023-01-20 NOTE — HISTORY OF PRESENT ILLNESS
[FreeTextEntry1] : Patient is a 72 year old female, never smoker, with a past medical history of HLD, benign thyroid nodule. She was referred by Dr. Collins. \par \par She was seen in 7/10/19 by Dr. Martinez for a periclavicular lesion. Underwent IR drainage and FNA which was non-diagnostic. Smear slides and cell block were acellular at that time. Patient was told she should return to the office to discuss possible resection. She preferred to wait and see if the cysts would return.\par \par At her last visit on 12/8/22, the mass was thought to be a possible sternoclavicular joint ganglion cyst. She presents today with an MRI of the area to better understand the cyst's relationship to the joint, and to further discuss resection options. \par \par Imaging is as follows: \par \par MRI sternoclavicular joint, no contrast (left) 1/10/23:\par -Likely cystic nodule within the subcutaneous tissues anterior to the left sternoclavicular joint; sonographic correlation can be performed to confirm cystic composition.\par \par Xray chest 12/8/22:\par -No lung nodule visualized. Left lower lobe calcified granuloma. Hyperinflation. Heart and mediastinum are unremarkable. Thoracic spine degenerative changes. Bilateral nipple shadows.\par \par \par

## 2023-02-03 RX ORDER — ASPIRIN 81 MG
81 TABLET, DELAYED RELEASE (ENTERIC COATED) ORAL
Refills: 0 | Status: DISCONTINUED | COMMUNITY
End: 2023-02-03

## 2023-02-03 RX ORDER — ROSUVASTATIN CALCIUM 5 MG/1
5 TABLET, FILM COATED ORAL
Refills: 0 | Status: ACTIVE | COMMUNITY

## 2023-02-06 ENCOUNTER — TRANSCRIPTION ENCOUNTER (OUTPATIENT)
Age: 73
End: 2023-02-06

## 2023-02-06 VITALS
WEIGHT: 129.41 LBS | SYSTOLIC BLOOD PRESSURE: 124 MMHG | RESPIRATION RATE: 16 BRPM | TEMPERATURE: 97 F | DIASTOLIC BLOOD PRESSURE: 73 MMHG | OXYGEN SATURATION: 98 % | HEIGHT: 69 IN

## 2023-02-06 NOTE — ASU PATIENT PROFILE, ADULT - TEACHING/LEARNING FACTORS INFLUENCE READINESS TO LEARN
[FreeTextEntry1] : Language: English\par Date of First visit: 01/26/2023 \par Accompanied by: wife\par Contact info: \par Referring Provider/PCP: Dr. Teixeira\par Fax: [phone]\par \par \par CC/ Problem List:\par BPH\par UTI / hematuria\par elevated PSA\par \par ===============================================================================\par FIRST VISIT / Summary:\par Very pleasant 69 yo M who has a very large prostate and considered rSPP in 2019 but then his symptoms improved. At that time he had UTI and hematuria, but after taking dutasteride his symptoms are now minimal. About 6 months ago he stopped taking the dutasteride, and changed to tamsulosin as needed. 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 [place]\par \par PSH: none \par \par \par ROS: Review of Systems is as per HPI unless otherwise denoted below\par \par \par ===============================================================================\par DATA: \par \par LABS (SELECTED):---------------------------------------------------------------------------------------------------\par 4/2013-3.75 with 18.4 % free PSA\par 6/2016- 5.09\par 1/2017-5.21\par 1/2018-10.23\par 11/2018-7.57 Density 0.06\par \par RADS:-------------------------------------------------------------------------------------------------------------------\par 8/2019: \par \par PATHOLOGY/CYTOLOGY:-------------------------------------------------------------------------------------------\par \par \par VOIDING STUDIES: ----------------------------------------------------------------------------------------------------\par 7/2019: \par 01/26/2023  \par \par STONE STUDIES: (Analysis/LLSA)----------------------------------------------------------------------------------\par \par \par PROCEDURES: -----------------------------------------------------------------------------------------------\par \par \par \par \par ===============================================================================\par \par PHYSICAL EXAM:\par \par GEN: AAOx3, NAD; \par \par PSYCH: Appropriate Behavior, Affect Congruent\par \par Lungs: No labored breathing.\par \par GAIT: Gait normal, Stability good\par \par ABD: no suprapubic or CVAT\par \par \par  FOCUSED: --------------(done 01/26/2023 )------------------------------------------------------------------------\par \par Prostate: enlarged approx >40g, nontender, symmetric, no nodules\par \par =======================================================================================\par DISCUSSION: \par =======================================================================================\par ASSESSMENT and PLAN\par \par \par 1. BPH / Luts\par - symptoms are stable, he restarted Dutasteride out of concern when PSA went back up\par - he wishes to stay on dutasteride with tamsulosin as needed\par - follow-up Jan 2024\par \par 2. Elevated PSA\par - Very large prostate gland though no recent imaging\par - may be a result of stopping dutasteride for 6 months\par - he will take again daily for some time and recheck PSA in 3 months\par - in the meantime get prostate MRI to evaluate for change (prior had a PIRADS 3 lesion)\par - follow-up in 1 month after MRI\par \par 3. Hematuria / UTI\par - sounds like occurred together, thus no workup required\par - repeat UA/cx today\par \par \par =======================================================================================\par Greater than 50% of this 60 minute visit was spent in direct face-to-face counseling with the patient or coordination with other care providers \par \par Thank you for allowing me to assist in the care of your patient. Should you have any questions please do not hesitate to reach out to me.\par \par \par Kendall Cook MD\par [facility]\par [facility]\par [address], [address]\[place] [address]\par T: [phone]\par F: [phone]\par 
none

## 2023-02-06 NOTE — PRE-OP CHECKLIST - 1.
Unable to interview the patient, does not  the phone. Nurse Manriquez made aware [id number], she spoke with the patient in regards time and medication list. Nurse Manriquez will email to the patient to call back.

## 2023-02-06 NOTE — ASU PATIENT PROFILE, ADULT - NSICDXPASTMEDICALHX_GEN_ALL_CORE_FT
PAST MEDICAL HISTORY:  No pertinent past medical history      PAST MEDICAL HISTORY:  H/O ganglion cyst sternoclavicular    Hyperlipemia     No pertinent past medical history     Thyroid nodule      PAST MEDICAL HISTORY:  H/O ganglion cyst sternoclavicular    Hyperlipemia     Thyroid nodule

## 2023-02-06 NOTE — ASU PATIENT PROFILE, ADULT - FALL HARM RISK - UNIVERSAL INTERVENTIONS
Bed in lowest position, wheels locked, appropriate side rails in place/Call bell, personal items and telephone in reach/Instruct patient to call for assistance before getting out of bed or chair/Non-slip footwear when patient is out of bed/[place] to call system/Physically safe environment - no spills, clutter or unnecessary equipment/Purposeful Proactive Rounding/Room/bathroom lighting operational, light cord in reach

## 2023-02-07 ENCOUNTER — TRANSCRIPTION ENCOUNTER (OUTPATIENT)
Age: 73
End: 2023-02-07

## 2023-02-07 ENCOUNTER — OUTPATIENT (OUTPATIENT)
Dept: INPATIENT UNIT | Facility: HOSPITAL | Age: 73
LOS: 1 days | Discharge: HOME | End: 2023-02-07
Payer: COMMERCIAL

## 2023-02-07 ENCOUNTER — APPOINTMENT (OUTPATIENT)
Dept: THORACIC SURGERY | Facility: HOSPITAL | Age: 73
End: 2023-02-07

## 2023-02-07 ENCOUNTER — RESULT REVIEW (OUTPATIENT)
Age: 73
End: 2023-02-07

## 2023-02-07 VITALS
RESPIRATION RATE: 22 BRPM | DIASTOLIC BLOOD PRESSURE: 57 MMHG | SYSTOLIC BLOOD PRESSURE: 118 MMHG | OXYGEN SATURATION: 100 % | HEART RATE: 61 BPM

## 2023-02-07 DIAGNOSIS — Z98.890 OTHER SPECIFIED POSTPROCEDURAL STATES: Chronic | ICD-10-CM

## 2023-02-07 DIAGNOSIS — K02.7 DENTAL ROOT CARIES: Chronic | ICD-10-CM

## 2023-02-07 LAB
BLD GP AB SCN SERPL QL: POSITIVE — SIGNIFICANT CHANGE UP
RH IG SCN BLD-IMP: NEGATIVE — SIGNIFICANT CHANGE UP

## 2023-02-07 PROCEDURE — 86922 COMPATIBILITY TEST ANTIGLOB: CPT

## 2023-02-07 PROCEDURE — 71045 X-RAY EXAM CHEST 1 VIEW: CPT | Mod: 26

## 2023-02-07 PROCEDURE — 86901 BLOOD TYPING SEROLOGIC RH(D): CPT

## 2023-02-07 PROCEDURE — 86077 PHYS BLOOD BANK SERV XMATCH: CPT

## 2023-02-07 PROCEDURE — 71045 X-RAY EXAM CHEST 1 VIEW: CPT

## 2023-02-07 PROCEDURE — 86850 RBC ANTIBODY SCREEN: CPT

## 2023-02-07 PROCEDURE — 21554 EXC NECK TUM DEEP 5 CM/>: CPT

## 2023-02-07 PROCEDURE — 88309 TISSUE EXAM BY PATHOLOGIST: CPT

## 2023-02-07 PROCEDURE — 86880 COOMBS TEST DIRECT: CPT

## 2023-02-07 PROCEDURE — 21554 EXC NECK TUM DEEP 5 CM/>: CPT | Mod: AS

## 2023-02-07 PROCEDURE — 88309 TISSUE EXAM BY PATHOLOGIST: CPT | Mod: 26

## 2023-02-07 PROCEDURE — 86870 RBC ANTIBODY IDENTIFICATION: CPT

## 2023-02-07 PROCEDURE — 86900 BLOOD TYPING SEROLOGIC ABO: CPT

## 2023-02-07 RX ORDER — ACETAMINOPHEN 500 MG
1000 TABLET ORAL ONCE
Refills: 0 | Status: COMPLETED | OUTPATIENT
Start: 2023-02-07 | End: 2023-02-07

## 2023-02-07 RX ORDER — ROSUVASTATIN CALCIUM 5 MG/1
1 TABLET ORAL
Qty: 0 | Refills: 0 | DISCHARGE

## 2023-02-07 RX ORDER — HEPARIN SODIUM 5000 [USP'U]/ML
5000 INJECTION INTRAVENOUS; SUBCUTANEOUS ONCE
Refills: 0 | Status: COMPLETED | OUTPATIENT
Start: 2023-02-07 | End: 2023-02-07

## 2023-02-07 RX ORDER — ASPIRIN/CALCIUM CARB/MAGNESIUM 324 MG
1 TABLET ORAL
Qty: 0 | Refills: 0 | DISCHARGE

## 2023-02-07 RX ADMIN — Medication 1000 MILLIGRAM(S): at 06:47

## 2023-02-07 NOTE — ASU DISCHARGE PLAN (ADULT/PEDIATRIC) - CARE PROVIDER_API CALL
Leora Lundberg; MPH)  Surgery; Thoracic Surgery  [address] [place] [address], [address]  [place], [place] [address]  Phone: [phone]  Fax: [phone]  Established Patient  Follow Up Time: 1 week

## 2023-02-07 NOTE — PRE-ANESTHESIA EVALUATION ADULT - NSANTHOSAYNRD_GEN_A_CORE
No. ANTWAN screening performed.  STOP BANG Legend: 0-2 = LOW Risk; 3-4 = INTERMEDIATE Risk; 5-8 = HIGH Risk

## 2023-02-07 NOTE — ASU DISCHARGE PLAN (ADULT/PEDIATRIC) - NS MD DC FALL RISK RISK
For information on Fall & Injury Prevention, visit: [email or web address] OR  [email or web address] OR  https://www.cdc.gov/steadi/patient.html

## 2023-02-13 PROBLEM — Z87.39 PERSONAL HISTORY OF OTHER DISEASES OF THE MUSCULOSKELETAL SYSTEM AND CONNECTIVE TISSUE: Chronic | Status: ACTIVE | Noted: 2023-02-06

## 2023-02-13 PROBLEM — E04.1 NONTOXIC SINGLE THYROID NODULE: Chronic | Status: ACTIVE | Noted: 2023-02-06

## 2023-02-13 PROBLEM — E78.5 HYPERLIPIDEMIA, UNSPECIFIED: Chronic | Status: ACTIVE | Noted: 2023-02-06

## 2023-02-13 LAB — SURGICAL PATHOLOGY STUDY: SIGNIFICANT CHANGE UP

## 2023-02-23 ENCOUNTER — APPOINTMENT (OUTPATIENT)
Dept: THORACIC SURGERY | Facility: CLINIC | Age: 73
End: 2023-02-23
Payer: COMMERCIAL

## 2023-02-23 ENCOUNTER — RESULT REVIEW (OUTPATIENT)
Age: 73
End: 2023-02-23

## 2023-02-23 ENCOUNTER — OUTPATIENT (OUTPATIENT)
Dept: OUTPATIENT SERVICES | Facility: HOSPITAL | Age: 73
LOS: 1 days | End: 2023-02-23
Payer: MEDICARE

## 2023-02-23 VITALS
DIASTOLIC BLOOD PRESSURE: 72 MMHG | TEMPERATURE: 97.5 F | RESPIRATION RATE: 18 BRPM | WEIGHT: 127 LBS | SYSTOLIC BLOOD PRESSURE: 116 MMHG | OXYGEN SATURATION: 96 % | HEART RATE: 71 BPM | HEIGHT: 69 IN | BODY MASS INDEX: 18.81 KG/M2

## 2023-02-23 DIAGNOSIS — K02.7 DENTAL ROOT CARIES: Chronic | ICD-10-CM

## 2023-02-23 DIAGNOSIS — M85.612 OTHER CYST OF BONE, LEFT SHOULDER: ICD-10-CM

## 2023-02-23 DIAGNOSIS — Z98.890 OTHER SPECIFIED POSTPROCEDURAL STATES: Chronic | ICD-10-CM

## 2023-02-23 DIAGNOSIS — Z09 ENCOUNTER FOR FOLLOW-UP EXAMINATION AFTER COMPLETED TREATMENT FOR CONDITIONS OTHER THAN MALIGNANT NEOPLASM: ICD-10-CM

## 2023-02-23 PROCEDURE — 71046 X-RAY EXAM CHEST 2 VIEWS: CPT

## 2023-02-23 PROCEDURE — 71046 X-RAY EXAM CHEST 2 VIEWS: CPT | Mod: 26

## 2023-02-23 PROCEDURE — 99213 OFFICE O/P EST LOW 20 MIN: CPT | Mod: 24

## 2023-02-23 NOTE — PHYSICAL EXAM
[Neck Appearance] : the appearance of the neck was normal [Neck Cervical Mass (___cm)] : no neck mass was observed [Jugular Venous Distention Increased] : there was no jugular-venous distention [Thyroid Diffuse Enlargement] : the thyroid was not enlarged [Thyroid Nodule] : there were no palpable thyroid nodules [] : no respiratory distress [Auscultation Breath Sounds / Voice Sounds] : lungs were clear to auscultation bilaterally [Abnormal Walk] : normal gait [Nail Clubbing] : no clubbing  or cyanosis of the fingernails [Musculoskeletal - Swelling] : no joint swelling seen [Motor Tone] : muscle strength and tone were normal [Deep Tendon Reflexes (DTR)] : deep tendon reflexes were 2+ and symmetric [Sensation] : the sensory exam was normal to light touch and pinprick [No Focal Deficits] : no focal deficits [Oriented To Time, Place, And Person] : oriented to person, place, and time [Impaired Insight] : insight and judgment were intact [Affect] : the affect was normal

## 2023-02-27 NOTE — DATA REVIEWED
[FreeTextEntry1] : MRI sternoclavicular joint, no contrast (left) 1/10/23:\par -Likely cystic nodule within the subcutaneous tissues anterior to the left sternoclavicular joint; sonographic correlation can be performed to confirm cystic composition.\par \par Xray chest 12/8/22:\par -No lung nodule visualized. Left lower lobe calcified granuloma. Hyperinflation. Heart and mediastinum are unremarkable. Thoracic spine degenerative changes. Bilateral nipple shadows.\par \par CT chest completed on 6/28/19:\par - no change in 4 mm nodule in right lower lobe\par - crescentic shaped fluid attenuation structures noted anterior and superior to the left sternoclavicular joint and superior to the left clavicle\par \par LEFT supraclavicular nodule FNA completed on 7/10/19:\par - nondiagnostic, smear slides and cell block are acellular.

## 2023-02-27 NOTE — ASSESSMENT
[FreeTextEntry1] :  72 year old female, never smoker, with a past medical history of HLD and a benign thyroid nodule. She was referred by Dr. Collins for evaluation of a recurrent cystic nodule within the subcutaneous tissues anterior to the left sternoclavicular joint.  She underwent an uncomplicated left upper chest cyst removal procedure on 2/7/23. \par \par She presents today for a follow up visit to assess her recovery and discuss results. Patient is doing well today. . Pain is controlled. Incision is healing well. Pathology reviewed with the patient, which is negative for malignancy, revealed: lymphangioma. Patient requires no further treatment and we have no concerns from a surgical standpoint. Will follow up with us as needed.\par \par Plan:\par 1.Follow up PRN\par \par I, Justin Zarate RN, am scribing for and the presence of Dr. Leora Lundberg  the following sections: history of present illness, past medical/family/surgical/family/social history, review of systems, vital signs, physical exam, and disposition.\par \par Leora LYNCH MD personally performed the services described in the documentation, reviewed the documentation recorded by the scribe in my presence and it accurately and completely records my words and actions. I have personally seen, examined, and participated in the care of this patient.  I have reviewed all pertinent clinical information, including history and physical exam and plan.  I agree with the above history, physical and plan of the ACP which I have reviewed and edited where appropriate.\par \par \par

## 2023-07-03 NOTE — DISCHARGE NOTE ADULT - MODE OF TRANSPORTATION
Called patient, updated patient on lab result, patient confirms wanting flagyl and verified pharmacy. Patient requesting daily maintenance RX of valtrex. Denies current outbreak, would just like to re-start daily RX. Patient has hx of valtrex 500mg PO daily PRN for genital herpes       Sent both Rxs. Ambulatory

## 2024-05-22 NOTE — ED ADULT TRIAGE NOTE - NS ED NOTE AC HIGH RISK COUNTRIES
Chief Complaint   Patient presents with    Hypertension       Have you seen any other physician or provider since your last visit no    Have you had any other diagnostic tests since your last visit? yes - labs and CXR last visit     Have you changed or stopped any medications since your last visit? yes - stopped Losartan         SUBJECTIVE:    Patient ID:Sue Lynn Collett is a 66 y.o. female.    Chief Complaint   Patient presents with    Hypertension     HPI:  Patient is here today to follow up on blood pressure. Last month she had hypotension and was taken off Losartan.     She is walking with a walker.  She continues to have issues with movement of her right leg and right arm weakness with no .  She is having a mri of her neck. She goes to Dr. Alvarado office.     She is concerned about the discoloration of her lower legs.      She reports taking her diabetic medication and it has been doing well.  She took of the cgm due to having mri testing.     Patient's medications, allergies, past medical, surgical, social and family histories were reviewed and updated as appropriate.          Review of Systems   Constitutional: Negative.    HENT: Negative.     Respiratory: Negative.     Cardiovascular: Negative.    Gastrointestinal: Negative.    Genitourinary: Negative.    Musculoskeletal:  Positive for arthralgias, gait problem, myalgias and neck pain.   Skin: Negative.    Psychiatric/Behavioral: Negative.         OBJECTIVE:  /80 (Site: Right Upper Arm, Position: Sitting, Cuff Size: Medium Adult)   Pulse 86   Temp 97 °F (36.1 °C) (Temporal)   Resp 14   Ht 1.676 m (5' 5.98\")   Wt 93.4 kg (206 lb)   LMP 01/01/1993 (Approximate)   SpO2 99% Comment: room air  BMI 33.27 kg/m²    Physical Exam  Vitals and nursing note reviewed.   Constitutional:       Appearance: She is well-developed.   HENT:      Head: Normocephalic and atraumatic.   Eyes:      Conjunctiva/sclera: Conjunctivae normal.      Pupils: Pupils are  No

## 2024-09-18 ENCOUNTER — APPOINTMENT (OUTPATIENT)
Dept: HEART AND VASCULAR | Facility: CLINIC | Age: 74
End: 2024-09-18

## 2024-09-18 ENCOUNTER — NON-APPOINTMENT (OUTPATIENT)
Age: 74
End: 2024-09-18

## 2024-09-18 VITALS
WEIGHT: 128 LBS | HEIGHT: 69 IN | BODY MASS INDEX: 18.96 KG/M2 | SYSTOLIC BLOOD PRESSURE: 114 MMHG | OXYGEN SATURATION: 98 % | DIASTOLIC BLOOD PRESSURE: 69 MMHG | HEART RATE: 81 BPM

## 2024-09-18 PROCEDURE — G2211 COMPLEX E/M VISIT ADD ON: CPT | Mod: NC

## 2024-09-18 PROCEDURE — 93000 ELECTROCARDIOGRAM COMPLETE: CPT

## 2024-09-18 PROCEDURE — 99214 OFFICE O/P EST MOD 30 MIN: CPT | Mod: 25

## 2024-09-18 NOTE — REASON FOR VISIT
[Coronary Artery Disease] : coronary artery disease [FreeTextEntry1] : 74-year-old female with past medical history significant for hyperlipidemia presents for cardiac evaluation.  The patient is suffering from a right frozen shoulder and she is starting PT and she is in tremendous discomfort.  From a cardiac standpoint she states that she is able to walk during her activities of daily life without any chest pain or shortness of breath but she is quite concerned about her coronary risk and she continues to take her Crestor

## 2024-09-18 NOTE — DISCUSSION/SUMMARY
[FreeTextEntry1] : 1.  Coronary artery disease-no recent CAD evaluation, therefore I will schedule her for a CCTA at [facility]  2.  Hyperlipidemia-well-controlled continue her Crestor  3.  Hypertension-no history of hypertension and blood pressures are very well-controlled today [EKG obtained to assist in diagnosis and management of assessed problem(s)] : EKG obtained to assist in diagnosis and management of assessed problem(s)

## 2024-09-18 NOTE — HISTORY OF PRESENT ILLNESS
[FreeTextEntry1] : 73 yo female with pmh sig for fhx of cad (father passed from mi at age 50), She presents for routine follow up. She feels well overall and describes a pinching sensation when laying on her L side and a pinched nerve in her arm after lifting something heavy for which she is going to PT for. Denied sob, ha, surekha, palpitations, anginal chest pain.

## 2024-10-22 ENCOUNTER — RESULT REVIEW (OUTPATIENT)
Age: 74
End: 2024-10-22

## 2025-05-21 ENCOUNTER — APPOINTMENT (OUTPATIENT)
Dept: OTOLARYNGOLOGY | Facility: CLINIC | Age: 75
End: 2025-05-21

## 2025-05-21 ENCOUNTER — APPOINTMENT (OUTPATIENT)
Dept: OTOLARYNGOLOGY | Facility: CLINIC | Age: 75
End: 2025-05-21
Payer: MEDICARE

## 2025-05-21 ENCOUNTER — NON-APPOINTMENT (OUTPATIENT)
Age: 75
End: 2025-05-21

## 2025-05-21 VITALS
DIASTOLIC BLOOD PRESSURE: 89 MMHG | OXYGEN SATURATION: 99 % | WEIGHT: 127 LBS | BODY MASS INDEX: 18.81 KG/M2 | SYSTOLIC BLOOD PRESSURE: 130 MMHG | TEMPERATURE: 97 F | HEART RATE: 59 BPM | HEIGHT: 69 IN

## 2025-05-21 DIAGNOSIS — J06.9 ACUTE UPPER RESPIRATORY INFECTION, UNSPECIFIED: ICD-10-CM

## 2025-05-21 DIAGNOSIS — H90.3 SENSORINEURAL HEARING LOSS, BILATERAL: ICD-10-CM

## 2025-05-21 DIAGNOSIS — Z78.9 OTHER SPECIFIED HEALTH STATUS: ICD-10-CM

## 2025-05-21 PROCEDURE — 31575 DIAGNOSTIC LARYNGOSCOPY: CPT

## 2025-05-21 PROCEDURE — 92567 TYMPANOMETRY: CPT

## 2025-05-21 PROCEDURE — 92557 COMPREHENSIVE HEARING TEST: CPT

## 2025-05-21 PROCEDURE — 99205 OFFICE O/P NEW HI 60 MIN: CPT | Mod: 25

## 2025-05-21 RX ORDER — PREDNISONE 10 MG/1
10 TABLET ORAL DAILY
Qty: 90 | Refills: 0 | Status: ACTIVE | COMMUNITY
Start: 2025-05-21 | End: 1900-01-01

## 2025-06-07 ENCOUNTER — OUTPATIENT (OUTPATIENT)
Dept: OUTPATIENT SERVICES | Facility: HOSPITAL | Age: 75
LOS: 1 days | End: 2025-06-07

## 2025-06-07 ENCOUNTER — APPOINTMENT (OUTPATIENT)
Dept: MRI IMAGING | Facility: CLINIC | Age: 75
End: 2025-06-07
Payer: MEDICARE

## 2025-06-07 DIAGNOSIS — K02.7 DENTAL ROOT CARIES: Chronic | ICD-10-CM

## 2025-06-07 DIAGNOSIS — Z98.890 OTHER SPECIFIED POSTPROCEDURAL STATES: Chronic | ICD-10-CM

## 2025-06-07 PROCEDURE — 70553 MRI BRAIN STEM W/O & W/DYE: CPT | Mod: 26

## 2025-06-12 ENCOUNTER — APPOINTMENT (OUTPATIENT)
Dept: OTOLARYNGOLOGY | Facility: CLINIC | Age: 75
End: 2025-06-12
Payer: MEDICARE

## 2025-06-12 VITALS
TEMPERATURE: 98.1 F | OXYGEN SATURATION: 97 % | HEART RATE: 78 BPM | SYSTOLIC BLOOD PRESSURE: 108 MMHG | DIASTOLIC BLOOD PRESSURE: 73 MMHG

## 2025-06-12 PROCEDURE — 99214 OFFICE O/P EST MOD 30 MIN: CPT

## 2025-07-10 ENCOUNTER — APPOINTMENT (OUTPATIENT)
Dept: OTOLARYNGOLOGY | Facility: CLINIC | Age: 75
End: 2025-07-10
Payer: MEDICARE

## 2025-07-10 PROCEDURE — 92565 STENGER TEST PURE TONE: CPT

## 2025-07-10 PROCEDURE — 92550 TYMPANOMETRY & REFLEX THRESH: CPT | Mod: 52

## 2025-07-10 PROCEDURE — 92557 COMPREHENSIVE HEARING TEST: CPT

## 2025-07-14 ENCOUNTER — APPOINTMENT (OUTPATIENT)
Dept: OTOLARYNGOLOGY | Facility: CLINIC | Age: 75
End: 2025-07-14
Payer: MEDICARE

## 2025-07-14 VITALS
HEART RATE: 78 BPM | DIASTOLIC BLOOD PRESSURE: 69 MMHG | SYSTOLIC BLOOD PRESSURE: 106 MMHG | HEIGHT: 69 IN | WEIGHT: 127 LBS | OXYGEN SATURATION: 99 % | BODY MASS INDEX: 18.81 KG/M2 | TEMPERATURE: 98.3 F

## 2025-07-14 PROCEDURE — 99215 OFFICE O/P EST HI 40 MIN: CPT

## (undated) DEVICE — ELCTR BOVIE TIP BLADE INSULATED 2.75" EDGE

## (undated) DEVICE — SUT SILK 0 30" PSL

## (undated) DEVICE — SUT SILK 2-0 12-18"

## (undated) DEVICE — Device

## (undated) DEVICE — ELCTR BOVIE TIP BLADE VALLEYLAB 6.5"

## (undated) DEVICE — SUT SILK 0 18" TIES

## (undated) DEVICE — STAPLER COVIDIEN ENDO GIA STANDARD HANDLE

## (undated) DEVICE — SUT MONOCRYL 4-0 27" PS-2 UNDYED

## (undated) DEVICE — DRAPE 3/4 SHEET 52X76"

## (undated) DEVICE — BLADE SURGICAL #15 CARBON

## (undated) DEVICE — DRSG TAPE UMBILICAL COTTON 2" X 30 X 1/8"

## (undated) DEVICE — SUT SILK 2-0 30" TIES

## (undated) DEVICE — DRSG STERISTRIPS 0.5 X 4"

## (undated) DEVICE — MARKING PEN W RULER

## (undated) DEVICE — SOL IRR BAG NS 0.9% 3000ML

## (undated) DEVICE — PACK UPPER BODY

## (undated) DEVICE — CHEST DRAIN PLEUR-EVAC DRY/WET ADULT-PEDS SINGLE (QUICK)

## (undated) DEVICE — DRSG MASTISOL

## (undated) DEVICE — PACK EXPLORATORY LAPAROTOMY

## (undated) DEVICE — SUT VICRYL 0 18" CT-1 UNDYED (POP-OFF)

## (undated) DEVICE — STAPLER SKIN PROXIMATE

## (undated) DEVICE — SUT SILK 0 30" TIES

## (undated) DEVICE — SUT VICRYL 2-0 27" SH UNDYED

## (undated) DEVICE — GOWN XL

## (undated) DEVICE — SUT SILK 2-0 30" SH

## (undated) DEVICE — DRAIN RESERVOIR FOR JACKSON PRATT 100CC CARDINAL

## (undated) DEVICE — SUT VICRYL 2-0 18" CT-1 UNDYED (POP-OFF)

## (undated) DEVICE — SUT VICRYL 0 27" UR-6